# Patient Record
Sex: FEMALE | Race: WHITE | NOT HISPANIC OR LATINO | URBAN - METROPOLITAN AREA
[De-identification: names, ages, dates, MRNs, and addresses within clinical notes are randomized per-mention and may not be internally consistent; named-entity substitution may affect disease eponyms.]

---

## 2017-02-14 ENCOUNTER — OFFICE VISIT (OUTPATIENT)
Dept: FAMILY MEDICINE | Facility: CLINIC | Age: 27
End: 2017-02-14
Attending: FAMILY MEDICINE
Payer: COMMERCIAL

## 2017-02-14 VITALS
WEIGHT: 111.69 LBS | DIASTOLIC BLOOD PRESSURE: 68 MMHG | RESPIRATION RATE: 16 BRPM | SYSTOLIC BLOOD PRESSURE: 114 MMHG | HEART RATE: 74 BPM | HEIGHT: 62 IN | BODY MASS INDEX: 20.55 KG/M2 | OXYGEN SATURATION: 98 %

## 2017-02-14 DIAGNOSIS — J30.9 ALLERGIC RHINITIS, UNSPECIFIED ALLERGIC RHINITIS TRIGGER, UNSPECIFIED RHINITIS SEASONALITY: Primary | ICD-10-CM

## 2017-02-14 PROCEDURE — 99203 OFFICE O/P NEW LOW 30 MIN: CPT | Mod: S$GLB,,, | Performed by: FAMILY MEDICINE

## 2017-02-14 PROCEDURE — 99999 PR PBB SHADOW E&M-NEW PATIENT-LVL III: CPT | Mod: PBBFAC,,, | Performed by: FAMILY MEDICINE

## 2017-02-14 RX ORDER — LEVOCETIRIZINE DIHYDROCHLORIDE 5 MG/1
5 TABLET, FILM COATED ORAL NIGHTLY
Qty: 30 TABLET | Refills: 3 | Status: SHIPPED | OUTPATIENT
Start: 2017-02-14 | End: 2018-08-09

## 2017-02-14 NOTE — MR AVS SNAPSHOT
[facility]  [address] [place] [address]  [address]  [facility] [address]  Phone: [phone]                  Violette Adams   2017 10:40 AM   Office Visit    Description:  Female : 1990   Provider:  Monique Boyer MD   Department:  [facility]           Reason for Visit     Sinus Problem           Diagnoses this Visit        Comments    Allergic rhinitis, unspecified allergic rhinitis trigger, unspecified rhinitis seasonality    -  Primary            To Do List           Goals (5 Years of Data)     None       These Medications        Disp Refills Start End    levocetirizine (XYZAL) 5 MG tablet 30 tablet 3 2017    Take 1 tablet (5 mg total) by mouth every evening. - Oral    Pharmacy: [id number] [address] #: [phone]         [facility]     [facility] Care Line -  Assistance  Registered nurses in the [facility] Center provide clinical advisement, health education, appointment booking, and other advisory services.  Call for this free service at [phone].             Medications           Message regarding Medications     Verify the changes and/or additions to your medication regime listed below are the same as discussed with your clinician today.  If any of these changes or additions are incorrect, please notify your healthcare provider.        START taking these NEW medications        Refills    levocetirizine (XYZAL) 5 MG tablet 3    Sig: Take 1 tablet (5 mg total) by mouth every evening.    Class: Normal    Route: Oral           Verify that the below list of medications is an accurate representation of the medications you are currently taking.  If none reported, the list may be blank. If incorrect, please contact your healthcare provider. Carry this list with you in case of emergency.           Current Medications     levocetirizine (XYZAL) 5 MG tablet Take 1  "tablet (5 mg total) by mouth every evening.           Clinical Reference Information           Your Vitals Were     BP Pulse Resp Height Weight Last Period    114/68 (BP Location: Left arm, Patient Position: Sitting, BP Method: Manual) 74 16 5' 2" (1.575 m) 50.7 kg (111 lb 11.2 oz) 01/24/2017 (Exact Date)    SpO2 BMI             98% 20.43 kg/m2         Blood Pressure          Most Recent Value    BP  114/68      Allergies as of 2/14/2017     No Known Allergies      Immunizations Administered on Date of Encounter - 2/14/2017     None      Instructions    Your test results will be communicated to you via : [facility], Telephone or Letter.   If you have not received your test results in one week, please contact the clinic at [phone].       Language Assistance Services     ATTENTION: Language assistance services are available, free of charge. Please call [phone].      ATENCIÓN: Si habla español, tiene a sanches disposición servicios gratuitos de asistencia lingüística. Llame al [phone].     CHÚ Ý: N?u b?n nói Ti?ng Vi?t, có các d?ch v? h? tr? ngôn ng? mi?n phí dành cho b?n. G?i s? [phone].         [facility] complies with applicable Federal civil rights laws and does not discriminate on the basis of race, color, national origin, age, disability, or sex.        "

## 2017-02-14 NOTE — PATIENT INSTRUCTIONS
Your test results will be communicated to you via : [facility], Telephone or Letter.   If you have not received your test results in one week, please contact the clinic at [phone].

## 2017-02-19 NOTE — PROGRESS NOTES
"Subjective:       Patient ID: Violette Adams is a 26 y.o. female.    Chief Complaint: Sinus Problem    HPI   Pt is here for several weeks of nasal congestion associated with clear rhinorrhea and itchy eyes and nose   pt is taking otc temp improvement however some days are worse than others   Review of Systems   Constitutional: Negative for chills, fatigue and fever.   HENT: Positive for postnasal drip, rhinorrhea and sneezing. Negative for congestion, ear pain, sinus pressure and sore throat.    Respiratory: Negative for cough, chest tightness and shortness of breath.    Cardiovascular: Negative for chest pain and palpitations.       Objective:      Physical Exam   Constitutional: She appears well-developed and well-nourished. No distress.   HENT:   Head: Normocephalic and atraumatic.   Nares patent bilaterally with thin clear drainage erythematous nares    Cardiovascular: Normal rate and regular rhythm.  Exam reveals no gallop.    Pulmonary/Chest: Effort normal and breath sounds normal. No respiratory distress. She has no rales.       Assessment:       1. Allergic rhinitis, unspecified allergic rhinitis trigger, unspecified rhinitis seasonality        Plan:     start xyzal  flonase if needed  otc hydrocortisone to outer nares   F/u allergy if needed  rtc prn        "This note will not be shared with the patient."   "

## 2017-08-07 ENCOUNTER — LAB VISIT (OUTPATIENT)
Dept: LAB | Facility: HOSPITAL | Age: 27
End: 2017-08-07
Attending: FAMILY MEDICINE
Payer: COMMERCIAL

## 2017-08-07 ENCOUNTER — PATIENT MESSAGE (OUTPATIENT)
Dept: FAMILY MEDICINE | Facility: CLINIC | Age: 27
End: 2017-08-07

## 2017-08-07 ENCOUNTER — OFFICE VISIT (OUTPATIENT)
Dept: FAMILY MEDICINE | Facility: CLINIC | Age: 27
End: 2017-08-07
Attending: FAMILY MEDICINE
Payer: COMMERCIAL

## 2017-08-07 VITALS
BODY MASS INDEX: 20.46 KG/M2 | DIASTOLIC BLOOD PRESSURE: 72 MMHG | SYSTOLIC BLOOD PRESSURE: 120 MMHG | WEIGHT: 111.19 LBS | HEART RATE: 68 BPM | HEIGHT: 62 IN | RESPIRATION RATE: 16 BRPM

## 2017-08-07 DIAGNOSIS — Z01.419 ENCOUNTER FOR GYNECOLOGICAL EXAMINATION WITH PAPANICOLAOU SMEAR OF CERVIX: Primary | ICD-10-CM

## 2017-08-07 DIAGNOSIS — Z01.419 ENCOUNTER FOR GYNECOLOGICAL EXAMINATION WITH PAPANICOLAOU SMEAR OF CERVIX: ICD-10-CM

## 2017-08-07 LAB
ALBUMIN SERPL BCP-MCNC: 3.7 G/DL
ALP SERPL-CCNC: 45 U/L
ALT SERPL W/O P-5'-P-CCNC: 12 U/L
ANION GAP SERPL CALC-SCNC: 7 MMOL/L
AST SERPL-CCNC: 17 U/L
BASOPHILS # BLD AUTO: 0.02 K/UL
BASOPHILS NFR BLD: 0.3 %
BILIRUB SERPL-MCNC: 0.4 MG/DL
BILIRUB SERPL-MCNC: NEGATIVE MG/DL
BLOOD URINE, POC: NEGATIVE
BUN SERPL-MCNC: 17 MG/DL
C TRACH DNA SPEC QL NAA+PROBE: NOT DETECTED
CALCIUM SERPL-MCNC: 9.6 MG/DL
CANDIDA RRNA VAG QL PROBE: NEGATIVE
CHLORIDE SERPL-SCNC: 107 MMOL/L
CO2 SERPL-SCNC: 22 MMOL/L
COLOR, POC UA: YELLOW
CREAT SERPL-MCNC: 0.9 MG/DL
DIFFERENTIAL METHOD: NORMAL
EOSINOPHIL # BLD AUTO: 0.1 K/UL
EOSINOPHIL NFR BLD: 2 %
ERYTHROCYTE [DISTWIDTH] IN BLOOD BY AUTOMATED COUNT: 13.2 %
EST. GFR  (AFRICAN AMERICAN): >60 ML/MIN/1.73 M^2
EST. GFR  (NON AFRICAN AMERICAN): >60 ML/MIN/1.73 M^2
G VAGINALIS RRNA GENITAL QL PROBE: NEGATIVE
GLUCOSE SERPL-MCNC: 83 MG/DL
GLUCOSE UR QL STRIP: NORMAL
HCT VFR BLD AUTO: 39.5 %
HGB BLD-MCNC: 13.4 G/DL
KETONES UR QL STRIP: NEGATIVE
LEUKOCYTE ESTERASE URINE, POC: ABNORMAL
LYMPHOCYTES # BLD AUTO: 2.3 K/UL
LYMPHOCYTES NFR BLD: 38.9 %
MCH RBC QN AUTO: 28.8 PG
MCHC RBC AUTO-ENTMCNC: 33.9 G/DL
MCV RBC AUTO: 85 FL
MONOCYTES # BLD AUTO: 0.6 K/UL
MONOCYTES NFR BLD: 9.7 %
N GONORRHOEA DNA SPEC QL NAA+PROBE: NOT DETECTED
NEUTROPHILS # BLD AUTO: 2.9 K/UL
NEUTROPHILS NFR BLD: 49.1 %
NITRITE, POC UA: NEGATIVE
PH, POC UA: 5
PLATELET # BLD AUTO: 271 K/UL
PMV BLD AUTO: 10.5 FL
POTASSIUM SERPL-SCNC: 4.6 MMOL/L
PROT SERPL-MCNC: 6.9 G/DL
PROTEIN, POC: ABNORMAL
RBC # BLD AUTO: 4.66 M/UL
SODIUM SERPL-SCNC: 136 MMOL/L
SPECIFIC GRAVITY, POC UA: 1.02
T VAGINALIS RRNA GENITAL QL PROBE: NEGATIVE
TSH SERPL DL<=0.005 MIU/L-ACNC: 3.46 UIU/ML
UROBILINOGEN, POC UA: NORMAL
WBC # BLD AUTO: 5.97 K/UL

## 2017-08-07 PROCEDURE — 80053 COMPREHEN METABOLIC PANEL: CPT

## 2017-08-07 PROCEDURE — 81001 URINALYSIS AUTO W/SCOPE: CPT | Mod: S$GLB,,, | Performed by: FAMILY MEDICINE

## 2017-08-07 PROCEDURE — 36415 COLL VENOUS BLD VENIPUNCTURE: CPT | Mod: PO

## 2017-08-07 PROCEDURE — 87591 N.GONORRHOEAE DNA AMP PROB: CPT

## 2017-08-07 PROCEDURE — 88175 CYTOPATH C/V AUTO FLUID REDO: CPT

## 2017-08-07 PROCEDURE — 99999 PR PBB SHADOW E&M-EST. PATIENT-LVL III: CPT | Mod: PBBFAC,,, | Performed by: FAMILY MEDICINE

## 2017-08-07 PROCEDURE — 99395 PREV VISIT EST AGE 18-39: CPT | Mod: 25,S$GLB,, | Performed by: FAMILY MEDICINE

## 2017-08-07 PROCEDURE — 85025 COMPLETE CBC W/AUTO DIFF WBC: CPT

## 2017-08-07 PROCEDURE — 87480 CANDIDA DNA DIR PROBE: CPT

## 2017-08-07 PROCEDURE — 87660 TRICHOMONAS VAGIN DIR PROBE: CPT

## 2017-08-07 PROCEDURE — 84443 ASSAY THYROID STIM HORMONE: CPT

## 2017-08-07 RX ORDER — DESOGESTREL AND ETHINYL ESTRADIOL 21-5 (28)
1 KIT ORAL DAILY
Qty: 90 TABLET | Refills: 3 | Status: SHIPPED | OUTPATIENT
Start: 2017-08-07 | End: 2017-08-08 | Stop reason: SDUPTHER

## 2017-08-07 RX ORDER — DESOGESTREL AND ETHINYL ESTRADIOL 21-5 (28)
1 KIT ORAL DAILY
COMMUNITY
End: 2017-08-07 | Stop reason: SDUPTHER

## 2017-08-07 RX ORDER — DESOGESTREL AND ETHINYL ESTRADIOL 21-5 (28)
1 KIT ORAL DAILY
Qty: 90 TABLET | Refills: 3 | Status: CANCELLED | OUTPATIENT
Start: 2017-08-07

## 2017-08-07 NOTE — MEDICAL/APP STUDENT
Subjective:       Patient ID: Violette Adams is a 27 y.o. female.    Chief Complaint: Gynecologic Exam    HPI      Patient presents today for well woman exam.    LMP 07/11/2017. Last 4-5 days. Occurs regularly ~ 28 days. Light normal flow. Currently on Kariva OCP.    Pap smear: 12/2016 with previous PCP. Last was normal. History of abnormal pap smears, summer 2016. Never had colposcopy.     Believes she had HPV series. Medical records needs to be sent to [facility].     Diet: Improving, implementing more vegetables and fruits and protein  Exercise: Will be starting a program today     Review of Systems   Constitutional: Negative for activity change, appetite change, chills and fever.   HENT: Positive for congestion (2/2 Seasonal allergic rhinitis). Negative for sinus pressure and sore throat.    Eyes: Negative for visual disturbance.   Respiratory: Negative for chest tightness and shortness of breath.    Cardiovascular: Negative for chest pain and leg swelling.   Gastrointestinal: Negative for abdominal pain, blood in stool, constipation and diarrhea.   Endocrine: Negative for polydipsia and polyuria.   Genitourinary: Negative for difficulty urinating, dysuria, hematuria, vaginal bleeding and vaginal discharge.   Musculoskeletal: Negative for arthralgias and back pain.   Skin: Negative for rash and wound.   Allergic/Immunologic: Positive for environmental allergies.   Neurological: Negative for dizziness, light-headedness, numbness and headaches.   Hematological: Does not bruise/bleed easily.   Psychiatric/Behavioral: Negative for behavioral problems, decreased concentration and sleep disturbance.       Objective:      Physical Exam   Constitutional: She is oriented to person, place, and time. She appears well-developed and well-nourished.   HENT:   Head: Normocephalic.   Mouth/Throat: Oropharynx is clear and moist.   Eyes: Conjunctivae are normal. Pupils are equal, round, and reactive to light.   Neck: Normal range  of motion. Neck supple. No thyromegaly present.   Cardiovascular: Normal rate, regular rhythm, normal heart sounds and intact distal pulses.    Pulmonary/Chest: Effort normal and breath sounds normal. She has no wheezes. She exhibits no mass and no tenderness. Right breast exhibits no mass, no nipple discharge, no skin change and no tenderness. Left breast exhibits no mass, no nipple discharge, no skin change and no tenderness. Breasts are symmetrical.   Abdominal: Soft. Bowel sounds are normal. She exhibits no distension and no mass. There is no tenderness.   Genitourinary: There is no rash or tenderness on the right labia. There is no rash or tenderness on the left labia. Cervix exhibits discharge (White discharge) and friability.       Musculoskeletal: Normal range of motion.   Lymphadenopathy:     She has no cervical adenopathy.   Neurological: She is alert and oriented to person, place, and time.   Skin: Skin is warm and dry. Capillary refill takes less than 2 seconds.   Psychiatric: She has a normal mood and affect. Her behavior is normal. Judgment and thought content normal.       Assessment:     Violette Adams is a 28 yo female presenting for well woman exam.    Plan:     1. Well woman exam  - Pap smear screening  - Chlamydia trachomatis and Neisseria gonorrhoeae DNA cervix probe  - Urinalysis  - Vaginosis screen  - CBC  - CMP  - TSH    Irma Martinez  MS-4

## 2017-08-07 NOTE — PROGRESS NOTES
Subjective:       Patient ID: Violette Adams is a 27 y.o. female.    Chief Complaint: Gynecologic Exam    HPI   Pt is here for wwe she is well no avnl vaginal bleeding no discharge itching or burning   Review of Systems   Constitutional: Negative for activity change, chills, diaphoresis, fatigue and fever.   HENT: Negative for congestion, ear discharge, ear pain, hearing loss, postnasal drip, rhinorrhea, sinus pressure, sneezing, sore throat, trouble swallowing and voice change.    Eyes: Negative for photophobia, discharge, redness, itching and visual disturbance.   Respiratory: Negative for cough, chest tightness, shortness of breath and wheezing.    Cardiovascular: Negative for chest pain, palpitations and leg swelling.   Gastrointestinal: Negative for abdominal pain, anal bleeding, blood in stool, constipation, diarrhea, nausea, rectal pain and vomiting.   Genitourinary: Negative for dyspareunia, dysuria, flank pain, frequency, hematuria, menstrual problem, pelvic pain, urgency, vaginal bleeding and vaginal discharge.   Musculoskeletal: Negative for arthralgias, back pain, joint swelling and neck pain.   Skin: Negative for color change and rash.   Neurological: Negative for dizziness, speech difficulty, weakness, light-headedness, numbness and headaches.   Hematological: Does not bruise/bleed easily.   Psychiatric/Behavioral: Negative for agitation, confusion, decreased concentration, sleep disturbance and suicidal ideas. The patient is not nervous/anxious.        Objective:      Physical Exam   Constitutional: She appears well-developed and well-nourished.   HENT:   Head: Normocephalic and atraumatic.   Right Ear: External ear normal.   Left Ear: External ear normal.   Nose: Nose normal.   Mouth/Throat: Oropharynx is clear and moist.   Eyes: Conjunctivae and EOM are normal. Pupils are equal, round, and reactive to light. Right eye exhibits no discharge. Left eye exhibits no discharge.   Neck: Normal range of  "motion. Neck supple. No thyromegaly present.   Cardiovascular: Normal rate, regular rhythm, normal heart sounds and intact distal pulses.  Exam reveals no gallop and no friction rub.    No murmur heard.  Pulmonary/Chest: Effort normal and breath sounds normal. She has no wheezes. She has no rales.   Abdominal: Soft. Bowel sounds are normal. She exhibits no distension. There is no tenderness. There is no rebound and no guarding.   Genitourinary: Vagina normal and uterus normal. No vaginal discharge found.   Genitourinary Comments: nefg v/v urethra/urethral meatus w/o lesions or prolapse normal hair distribution cervix pink no adnexal tenderness or fullness moveable uterus     Breasts symmetric no masses nipple discharge or overlying skin changes    Musculoskeletal: Normal range of motion. She exhibits no edema or tenderness.   Lymphadenopathy:     She has no cervical adenopathy.   Neurological: She is alert. No cranial nerve deficit. She exhibits normal muscle tone. Coordination normal.   Skin: Skin is warm and dry. No rash noted. No erythema.   Psychiatric: She has a normal mood and affect. Her behavior is normal. Judgment and thought content normal.       Assessment:       1. Encounter for gynecological examination with Papanicolaou smear of cervix        Plan:     orders cmp cbc lipid tsh urine pap gc/chl  Cont meds   low falt diet  Graded exercise    Health maintenance  Pap today  Breast exam today  Lipid ordered  Flu shot in fall  Tetanus q 10 years      "This note will not be shared with the patient."   "

## 2017-08-07 NOTE — PATIENT INSTRUCTIONS
Your test results will be communicated to you via : [facility], Telephone or Letter.   If you have not received your test results in one week, please contact the clinic at [phone].

## 2017-08-07 NOTE — TELEPHONE ENCOUNTER
----- Message from Estelle Del Rio sent at 8/7/2017  3:40 PM CDT -----  Contact: pt  _  1st Request  _  2nd Request  _  3rd Request    Please refill the medication(s) listed below. Please call the patient when the prescription(s) is ready for  at the phone number (___)(___-_____) .[phone]    desog-e.estradiol/e.estradiol (KARIVA) 0.15-0.02 mgx21 /0.01 mg x 5 per tablet   Medication #1    Medication #2      [facility] [address]

## 2017-08-08 RX ORDER — DESOGESTREL AND ETHINYL ESTRADIOL 21-5 (28)
1 KIT ORAL DAILY
Qty: 90 TABLET | Refills: 3 | Status: SHIPPED | OUTPATIENT
Start: 2017-08-08 | End: 2017-08-09 | Stop reason: SDUPTHER

## 2017-08-09 RX ORDER — DESOGESTREL AND ETHINYL ESTRADIOL 21-5 (28)
1 KIT ORAL DAILY
Qty: 90 TABLET | Refills: 3 | Status: SHIPPED | OUTPATIENT
Start: 2017-08-09 | End: 2018-07-04 | Stop reason: SDUPTHER

## 2018-05-24 ENCOUNTER — TELEPHONE (OUTPATIENT)
Dept: FAMILY MEDICINE | Facility: CLINIC | Age: 28
End: 2018-05-24

## 2018-05-24 DIAGNOSIS — Z00.00 WELLNESS EXAMINATION: Primary | ICD-10-CM

## 2018-05-24 NOTE — TELEPHONE ENCOUNTER
Patient would like to have pre visit labs done prior to her appointment on 6/5/18. Please enter orders.Thanks.

## 2018-05-24 NOTE — TELEPHONE ENCOUNTER
----- Message from Drea Sunshine LPN sent at 5/24/2018  7:53 AM CDT -----  Patient has appointment scheduled on 6/5/18 is asking about blood work and fasting. Please contact patient. Thanks in advance.

## 2018-07-03 NOTE — TELEPHONE ENCOUNTER
----- Message from Sultana Sharma sent at 7/3/2018  7:56 AM CDT -----  Contact: MATI DE LA CRUZ [[id number]]      Can the clinic reply in [facility]: No      Please refill the medication(s) listed below. Please call the patient when the prescription(s) is ready for  at this phone number   MATI DE LA CRUZ / # [phone]        Medication #1 desog-e.estradiol/e.estradiol (KARIVA) 0.15-0.02 mgx21 /0.01 mg x 5 per tablet      Preferred Pharmacy: [facility] #[id number] - [place] - [address]     [phone] (Phone)  [phone] (Fax)

## 2018-07-04 RX ORDER — DESOGESTREL/ETHINYL ESTRADIOL AND ETHINYL ESTRADIOL 21-5 (28)
1 KIT ORAL DAILY
Qty: 84 TABLET | Refills: 3 | Status: SHIPPED | OUTPATIENT
Start: 2018-07-04 | End: 2018-08-09 | Stop reason: SDUPTHER

## 2018-07-06 RX ORDER — DESOGESTREL AND ETHINYL ESTRADIOL 21-5 (28)
1 KIT ORAL DAILY
Qty: 90 TABLET | Refills: 3 | Status: SHIPPED | OUTPATIENT
Start: 2018-07-06 | End: 2018-10-03 | Stop reason: RX

## 2018-08-06 ENCOUNTER — LAB VISIT (OUTPATIENT)
Dept: LAB | Facility: HOSPITAL | Age: 28
End: 2018-08-06
Attending: FAMILY MEDICINE
Payer: COMMERCIAL

## 2018-08-06 DIAGNOSIS — Z00.00 WELLNESS EXAMINATION: ICD-10-CM

## 2018-08-06 LAB
BASOPHILS # BLD AUTO: 0.05 K/UL
BASOPHILS NFR BLD: 0.7 %
CHOLEST SERPL-MCNC: 195 MG/DL
CHOLEST/HDLC SERPL: 2.4 {RATIO}
DIFFERENTIAL METHOD: NORMAL
EOSINOPHIL # BLD AUTO: 0.2 K/UL
EOSINOPHIL NFR BLD: 2.4 %
ERYTHROCYTE [DISTWIDTH] IN BLOOD BY AUTOMATED COUNT: 12.8 %
HCT VFR BLD AUTO: 42.3 %
HDLC SERPL-MCNC: 82 MG/DL
HDLC SERPL: 42.1 %
HGB BLD-MCNC: 13.7 G/DL
IMM GRANULOCYTES # BLD AUTO: 0 K/UL
IMM GRANULOCYTES NFR BLD AUTO: 0 %
LDLC SERPL CALC-MCNC: 97.2 MG/DL
LYMPHOCYTES # BLD AUTO: 3.1 K/UL
LYMPHOCYTES NFR BLD: 44.2 %
MCH RBC QN AUTO: 28.5 PG
MCHC RBC AUTO-ENTMCNC: 32.4 G/DL
MCV RBC AUTO: 88 FL
MONOCYTES # BLD AUTO: 0.7 K/UL
MONOCYTES NFR BLD: 9.9 %
NEUTROPHILS # BLD AUTO: 3 K/UL
NEUTROPHILS NFR BLD: 42.8 %
NONHDLC SERPL-MCNC: 113 MG/DL
NRBC BLD-RTO: 0 /100 WBC
PLATELET # BLD AUTO: 308 K/UL
PMV BLD AUTO: 11 FL
RBC # BLD AUTO: 4.8 M/UL
TRIGL SERPL-MCNC: 79 MG/DL
WBC # BLD AUTO: 6.95 K/UL

## 2018-08-06 PROCEDURE — 80061 LIPID PANEL: CPT

## 2018-08-06 PROCEDURE — 85025 COMPLETE CBC W/AUTO DIFF WBC: CPT

## 2018-08-06 PROCEDURE — 36415 COLL VENOUS BLD VENIPUNCTURE: CPT | Mod: PO

## 2018-08-09 ENCOUNTER — OFFICE VISIT (OUTPATIENT)
Dept: FAMILY MEDICINE | Facility: CLINIC | Age: 28
End: 2018-08-09
Attending: FAMILY MEDICINE
Payer: COMMERCIAL

## 2018-08-09 VITALS
WEIGHT: 111.69 LBS | HEIGHT: 62 IN | DIASTOLIC BLOOD PRESSURE: 80 MMHG | BODY MASS INDEX: 20.55 KG/M2 | HEART RATE: 70 BPM | SYSTOLIC BLOOD PRESSURE: 112 MMHG | OXYGEN SATURATION: 97 %

## 2018-08-09 DIAGNOSIS — Z01.419 ENCOUNTER FOR GYNECOLOGICAL EXAMINATION WITH PAPANICOLAOU SMEAR OF CERVIX: Primary | ICD-10-CM

## 2018-08-09 PROCEDURE — 87491 CHLMYD TRACH DNA AMP PROBE: CPT

## 2018-08-09 PROCEDURE — 99395 PREV VISIT EST AGE 18-39: CPT | Mod: S$GLB,,, | Performed by: FAMILY MEDICINE

## 2018-08-09 PROCEDURE — 88175 CYTOPATH C/V AUTO FLUID REDO: CPT

## 2018-08-09 PROCEDURE — 99999 PR PBB SHADOW E&M-EST. PATIENT-LVL IV: CPT | Mod: PBBFAC,,, | Performed by: FAMILY MEDICINE

## 2018-08-09 NOTE — PROGRESS NOTES
Subjective:       Patient ID: Violette Adams is a 28 y.o. female.    Chief Complaint: Gynecologic Exam    HPI   Pt is here for wwe she is well no abnl vaginal bleeding no discharge itching or burning no dysuria or hematuria no brbpr   Pt is under stress right now job demanding along with planning a wedding pt feels anxious declines meds since this is situational no si/hi no  Panic attacks   Review of Systems   Constitutional: Negative for activity change, chills, diaphoresis, fatigue, fever and unexpected weight change.   HENT: Negative for congestion, ear discharge, ear pain, hearing loss, postnasal drip, rhinorrhea, sinus pressure, sneezing, sore throat, trouble swallowing and voice change.    Eyes: Negative for photophobia, discharge, redness, itching and visual disturbance.   Respiratory: Negative for cough, chest tightness, shortness of breath and wheezing.    Cardiovascular: Positive for palpitations. Negative for chest pain and leg swelling.   Gastrointestinal: Negative for abdominal pain, anal bleeding, blood in stool, constipation, diarrhea, nausea, rectal pain and vomiting.   Endocrine: Negative for polydipsia and polyuria.   Genitourinary: Negative for difficulty urinating, dyspareunia, dysuria, flank pain, frequency, hematuria, menstrual problem, pelvic pain, urgency, vaginal bleeding and vaginal discharge.   Musculoskeletal: Negative for arthralgias, back pain, joint swelling and neck pain.   Skin: Negative for color change and rash.   Neurological: Negative for dizziness, speech difficulty, weakness, light-headedness, numbness and headaches.   Hematological: Does not bruise/bleed easily.   Psychiatric/Behavioral: Positive for dysphoric mood. Negative for agitation, confusion, decreased concentration, sleep disturbance and suicidal ideas. The patient is nervous/anxious.        Objective:      Physical Exam   Constitutional: She appears well-developed and well-nourished.   HENT:   Head: Normocephalic  "and atraumatic.   Right Ear: External ear normal.   Left Ear: External ear normal.   Nose: Nose normal.   Mouth/Throat: Oropharynx is clear and moist.   Eyes: Conjunctivae and EOM are normal. Pupils are equal, round, and reactive to light. Right eye exhibits no discharge. Left eye exhibits no discharge.   Neck: Normal range of motion. Neck supple. No thyromegaly present.   Cardiovascular: Normal rate, regular rhythm and intact distal pulses.  Exam reveals no gallop and no friction rub.    Pulmonary/Chest: Effort normal and breath sounds normal. She has no wheezes. She has no rales.   Abdominal: Soft. Bowel sounds are normal. She exhibits no distension. There is no tenderness. There is no rebound and no guarding.   Genitourinary: Vagina normal and uterus normal. No vaginal discharge found.   Genitourinary Comments: nefg v/v urethra/urethral meatus w/o leisons or prolapse normal hair distribution cervix pink no adnexal tenderness or fullness moveable uterus     Breasts symmetric no masses nipple discharge or overlying skin changes    Musculoskeletal: Normal range of motion. She exhibits no edema or tenderness.   Lymphadenopathy:     She has no cervical adenopathy.   Neurological: She is alert. No cranial nerve deficit. She exhibits normal muscle tone. Coordination normal.   Skin: Skin is warm and dry. No rash noted. No erythema.   Psychiatric: She has a normal mood and affect. Her behavior is normal. Judgment and thought content normal.       Assessment:       1. Encounter for gynecological examination with Papanicolaou smear of cervix        Plan:     orders cbc cmp lipid tsh urine  Cont meds     Low fat diet  Graded exercise  "This note will not be shared with the patient."   "

## 2018-08-09 NOTE — PATIENT INSTRUCTIONS
Your test results will be communicated to you via : [facility], Telephone or Letter.   If you have not received your test results in one week, please contact the clinic at [phone].

## 2018-08-10 LAB
C TRACH DNA SPEC QL NAA+PROBE: NOT DETECTED
N GONORRHOEA DNA SPEC QL NAA+PROBE: NOT DETECTED

## 2018-10-03 ENCOUNTER — PATIENT MESSAGE (OUTPATIENT)
Dept: FAMILY MEDICINE | Facility: CLINIC | Age: 28
End: 2018-10-03

## 2018-10-03 ENCOUNTER — TELEPHONE (OUTPATIENT)
Dept: FAMILY MEDICINE | Facility: CLINIC | Age: 28
End: 2018-10-03

## 2018-10-03 RX ORDER — NORGESTIMATE AND ETHINYL ESTRADIOL 7DAYSX3 28
1 KIT ORAL DAILY
Qty: 90 TABLET | Refills: 3 | Status: SHIPPED | OUTPATIENT
Start: 2018-10-03 | End: 2019-08-02

## 2018-10-03 NOTE — TELEPHONE ENCOUNTER
----- Message from Priscilla Frederick sent at 10/3/2018 11:42 AM CDT -----  Contact: Estelle from [facility]     Name of Who is Calling: Estelle from [facility]     What is the request in detail: Estelle would like an alternative birth control sent to pharmacy states medication Viorele is out of stock. Please call       Can the clinic reply by [facility]: no    What Number to Call Back if not in [facility]: [phone]

## 2019-08-01 ENCOUNTER — TELEPHONE (OUTPATIENT)
Dept: FAMILY MEDICINE | Facility: CLINIC | Age: 29
End: 2019-08-01

## 2019-08-01 DIAGNOSIS — Z00.00 ANNUAL PHYSICAL EXAM: Primary | ICD-10-CM

## 2019-08-01 NOTE — TELEPHONE ENCOUNTER
----- Message from Radha Lei sent at 8/1/2019 12:09 PM CDT -----  Contact: MATI DE LA CRUZ   Name of Who is Calling: MATI DE LA CRUZ       What is the request in detail: patient is requesting a call back. She states that she has an annual appointment scheduled for 8/3 which was approved by her insurance company. She wants to know if she would need to have any blood work done for that visit.       Can the clinic reply by [facility]: no      What Number to Call Back if not in [facility]:  [phone]

## 2019-08-02 ENCOUNTER — OFFICE VISIT (OUTPATIENT)
Dept: FAMILY MEDICINE | Facility: CLINIC | Age: 29
End: 2019-08-02
Attending: FAMILY MEDICINE
Payer: COMMERCIAL

## 2019-08-02 ENCOUNTER — TELEPHONE (OUTPATIENT)
Dept: FAMILY MEDICINE | Facility: CLINIC | Age: 29
End: 2019-08-02

## 2019-08-02 VITALS
WEIGHT: 113.19 LBS | HEART RATE: 87 BPM | BODY MASS INDEX: 20.83 KG/M2 | HEIGHT: 62 IN | OXYGEN SATURATION: 97 % | DIASTOLIC BLOOD PRESSURE: 84 MMHG | SYSTOLIC BLOOD PRESSURE: 128 MMHG

## 2019-08-02 DIAGNOSIS — Z01.419 ENCOUNTER FOR GYNECOLOGICAL EXAMINATION WITH PAPANICOLAOU SMEAR OF CERVIX: Primary | ICD-10-CM

## 2019-08-02 PROCEDURE — 81001 POCT URINALYSIS, DIPSTICK OR TABLET REAGENT, AUTOMATED, WITH MICROSCOP: ICD-10-PCS | Mod: S$GLB,,, | Performed by: FAMILY MEDICINE

## 2019-08-02 PROCEDURE — 88175 CYTOPATH C/V AUTO FLUID REDO: CPT

## 2019-08-02 PROCEDURE — 87491 CHLMYD TRACH DNA AMP PROBE: CPT

## 2019-08-02 PROCEDURE — 81001 URINALYSIS AUTO W/SCOPE: CPT | Mod: S$GLB,,, | Performed by: FAMILY MEDICINE

## 2019-08-02 PROCEDURE — 99999 PR PBB SHADOW E&M-EST. PATIENT-LVL III: CPT | Mod: PBBFAC,,, | Performed by: FAMILY MEDICINE

## 2019-08-02 PROCEDURE — 99395 PR PREVENTIVE VISIT,EST,18-39: ICD-10-PCS | Mod: 25,S$GLB,, | Performed by: FAMILY MEDICINE

## 2019-08-02 PROCEDURE — 99395 PREV VISIT EST AGE 18-39: CPT | Mod: 25,S$GLB,, | Performed by: FAMILY MEDICINE

## 2019-08-02 PROCEDURE — 99999 PR PBB SHADOW E&M-EST. PATIENT-LVL III: ICD-10-PCS | Mod: PBBFAC,,, | Performed by: FAMILY MEDICINE

## 2019-08-02 RX ORDER — DESOGESTREL/ETHINYL ESTRADIOL AND ETHINYL ESTRADIOL 21-5 (28)
1 KIT ORAL DAILY
Refills: 0 | COMMUNITY
Start: 2019-07-05 | End: 2019-09-30 | Stop reason: SDUPTHER

## 2019-08-02 NOTE — TELEPHONE ENCOUNTER
----- Message from Radha Odom sent at 8/2/2019 10:06 AM CDT -----  Contact: MATI DE LA CRUZ   Type:  Patient Returning Call      Who Called: MATI DE LA CRUZ       Who Left Message for Patient: unknown      Does the patient know what this is regarding?: unknown      Best Call Back Number: [phone]      Additional Information: n/a

## 2019-08-02 NOTE — PATIENT INSTRUCTIONS
Violette,     We are always striving for excellence. Should you receive a patient experience survey electronically or by mail, we would appreciate if you would take a few moments to give us your feedback. These surveys let us know our strengths as well as areas of opportunity for improvement to better serve you.    Thank you for your time,  Teri Santoyo MA    Your test results will be communicated to you via : [facility], Telephone or Letter.   If you have not received your test results in one week, please contact the clinic at [phone].

## 2019-08-02 NOTE — TELEPHONE ENCOUNTER
----- Message from Kari Barakat sent at 8/2/2019 11:09 AM CDT -----  Please call pt work number [phone]

## 2019-08-05 LAB
BILIRUB SERPL-MCNC: NORMAL MG/DL
BLOOD URINE, POC: NORMAL
C TRACH DNA SPEC QL NAA+PROBE: NOT DETECTED
COLOR, POC UA: YELLOW
GLUCOSE UR QL STRIP: NORMAL
KETONES UR QL STRIP: NORMAL
LEUKOCYTE ESTERASE URINE, POC: NORMAL
N GONORRHOEA DNA SPEC QL NAA+PROBE: NOT DETECTED
NITRITE, POC UA: NORMAL
PH, POC UA: 6
PROTEIN, POC: NORMAL
SPECIFIC GRAVITY, POC UA: 1
UROBILINOGEN, POC UA: NORMAL

## 2019-08-06 NOTE — PROGRESS NOTES
Subjective:       Patient ID: Violette Esposito is a 29 y.o. female.    Chief Complaint: Gynecologic Exam    HPI   Pt is here for wwe she is well no abnl vaginal bleeding no discharge itching or burning no dysuria or hematuria no brbpr   Review of Systems   Constitutional: Negative for activity change, chills, diaphoresis, fatigue, fever and unexpected weight change.   HENT: Negative for congestion, ear discharge, ear pain, hearing loss, postnasal drip, rhinorrhea, sinus pressure, sneezing, sore throat, trouble swallowing and voice change.    Eyes: Negative for photophobia, discharge, redness, itching and visual disturbance.   Respiratory: Negative for cough, chest tightness, shortness of breath and wheezing.    Cardiovascular: Negative for chest pain, palpitations and leg swelling.   Gastrointestinal: Negative for abdominal pain, anal bleeding, blood in stool, constipation, diarrhea, nausea, rectal pain and vomiting.   Endocrine: Negative for polydipsia and polyuria.   Genitourinary: Negative for difficulty urinating, dyspareunia, dysuria, flank pain, frequency, hematuria, menstrual problem, pelvic pain, urgency, vaginal bleeding and vaginal discharge.   Musculoskeletal: Negative for arthralgias, back pain, joint swelling and neck pain.   Skin: Negative for color change and rash.   Neurological: Negative for dizziness, speech difficulty, weakness, light-headedness, numbness and headaches.   Hematological: Does not bruise/bleed easily.   Psychiatric/Behavioral: Negative for agitation, confusion, decreased concentration, dysphoric mood, sleep disturbance and suicidal ideas. The patient is not nervous/anxious.        Objective:      Physical Exam   Constitutional: She appears well-developed and well-nourished.   HENT:   Head: Normocephalic and atraumatic.   Right Ear: External ear normal.   Left Ear: External ear normal.   Nose: Nose normal.   Mouth/Throat: Oropharynx is clear and moist.   Eyes: Pupils are equal,  "round, and reactive to light. Conjunctivae and EOM are normal. Right eye exhibits no discharge. Left eye exhibits no discharge.   Neck: Normal range of motion. Neck supple. No thyromegaly present.   Cardiovascular: Normal rate and regular rhythm. Exam reveals no gallop.   Pulmonary/Chest: Effort normal and breath sounds normal. She has no wheezes. She has no rales.   Abdominal: Soft. Bowel sounds are normal. She exhibits no distension. There is no tenderness. There is no rebound and no guarding.   Genitourinary: Vagina normal and uterus normal. No vaginal discharge found.   Genitourinary Comments: nefg v/v urethra/urethral meatus w/o lesions or prolapse normal hair distribution cervix pink no adnexal tenderness or fullness moveable uterus    Breasts symmetric no masses nipple discharge or overlying skin changes    Musculoskeletal: Normal range of motion. She exhibits no edema or tenderness.   Lymphadenopathy:     She has no cervical adenopathy.   Neurological: She is alert. No cranial nerve deficit. She exhibits normal muscle tone. Coordination normal.   Skin: Skin is warm and dry. No rash noted. No erythema.   Psychiatric: She has a normal mood and affect. Her behavior is normal. Judgment and thought content normal.       Assessment:       1. Encounter for gynecological examination with Papanicolaou smear of cervix        Plan:        orders cmp lipid cbc tsh urine pap gc/chl  Cont meds  Low fat diet  Graded exercise   incrase water intake  rtc annually and prn    Health maintenancepap today   Breast exam today  Lipid ordered  Flu in fall  Tetanus q 10 years  "This note will not be shared with the patient."   "

## 2019-09-27 ENCOUNTER — PATIENT MESSAGE (OUTPATIENT)
Dept: FAMILY MEDICINE | Facility: CLINIC | Age: 29
End: 2019-09-27

## 2019-09-30 NOTE — TELEPHONE ENCOUNTER
Michael Amaya,    Can I get a refill on Kariva (BC pills) sent to [facility]?    Phone: [phone]    [email or web address]    Thank you,    Violette

## 2019-10-02 ENCOUNTER — TELEPHONE (OUTPATIENT)
Dept: FAMILY MEDICINE | Facility: CLINIC | Age: 29
End: 2019-10-02

## 2019-10-03 ENCOUNTER — TELEPHONE (OUTPATIENT)
Dept: FAMILY MEDICINE | Facility: CLINIC | Age: 29
End: 2019-10-03

## 2019-10-03 ENCOUNTER — PATIENT MESSAGE (OUTPATIENT)
Dept: FAMILY MEDICINE | Facility: CLINIC | Age: 29
End: 2019-10-03

## 2019-10-03 RX ORDER — DESOGESTREL AND ETHINYL ESTRADIOL 21-5 (28)
1 KIT ORAL DAILY
Qty: 30 TABLET | Refills: 11 | Status: SHIPPED | OUTPATIENT
Start: 2019-10-03 | End: 2019-10-03 | Stop reason: SDUPTHER

## 2019-10-03 RX ORDER — DESOGESTREL AND ETHINYL ESTRADIOL 21-5 (28)
1 KIT ORAL DAILY
Qty: 30 TABLET | Refills: 11 | Status: SHIPPED | OUTPATIENT
Start: 2019-10-03 | End: 2020-08-27 | Stop reason: SDUPTHER

## 2019-10-03 NOTE — TELEPHONE ENCOUNTER
----- Message from Rose Villalba sent at 10/3/2019  8:46 AM CDT -----  Contact: MATI MORA [[id number]]  Can the clinic reply in [facility]: Y      Please refill the medication(s) listed below. Please call the patient when the prescription(s) is ready for  at this phone number  [phone].       Medication #1 KARIVA, 28, 0.15-0.02 mgx21 /0.01 mg x 5 per tablet      Preferred Pharmacy: [facility]  [address] [place]

## 2019-10-05 RX ORDER — DESOGESTREL/ETHINYL ESTRADIOL AND ETHINYL ESTRADIOL 21-5 (28)
1 KIT ORAL DAILY
Qty: 84 TABLET | Refills: 3 | Status: SHIPPED | OUTPATIENT
Start: 2019-10-05 | End: 2020-03-13 | Stop reason: SDUPTHER

## 2020-03-13 ENCOUNTER — OFFICE VISIT (OUTPATIENT)
Dept: FAMILY MEDICINE | Facility: CLINIC | Age: 30
End: 2020-03-13
Payer: COMMERCIAL

## 2020-03-13 VITALS
HEART RATE: 112 BPM | WEIGHT: 112.88 LBS | TEMPERATURE: 99 F | BODY MASS INDEX: 20.77 KG/M2 | DIASTOLIC BLOOD PRESSURE: 80 MMHG | SYSTOLIC BLOOD PRESSURE: 129 MMHG | HEIGHT: 62 IN

## 2020-03-13 DIAGNOSIS — J10.1 INFLUENZA B: Primary | ICD-10-CM

## 2020-03-13 DIAGNOSIS — R05.9 COUGH: ICD-10-CM

## 2020-03-13 DIAGNOSIS — R09.81 NASAL CONGESTION: ICD-10-CM

## 2020-03-13 LAB
INFLUENZA A, MOLECULAR: NEGATIVE
INFLUENZA B, MOLECULAR: POSITIVE
SPECIMEN SOURCE: ABNORMAL

## 2020-03-13 PROCEDURE — 3008F PR BODY MASS INDEX (BMI) DOCUMENTED: ICD-10-PCS | Mod: CPTII,S$GLB,, | Performed by: NURSE PRACTITIONER

## 2020-03-13 PROCEDURE — 99999 PR PBB SHADOW E&M-EST. PATIENT-LVL III: ICD-10-PCS | Mod: PBBFAC,,, | Performed by: NURSE PRACTITIONER

## 2020-03-13 PROCEDURE — 99213 PR OFFICE/OUTPT VISIT, EST, LEVL III, 20-29 MIN: ICD-10-PCS | Mod: S$GLB,,, | Performed by: NURSE PRACTITIONER

## 2020-03-13 PROCEDURE — 99999 PR PBB SHADOW E&M-EST. PATIENT-LVL III: CPT | Mod: PBBFAC,,, | Performed by: NURSE PRACTITIONER

## 2020-03-13 PROCEDURE — 87502 INFLUENZA DNA AMP PROBE: CPT | Mod: PO

## 2020-03-13 PROCEDURE — 99213 OFFICE O/P EST LOW 20 MIN: CPT | Mod: S$GLB,,, | Performed by: NURSE PRACTITIONER

## 2020-03-13 PROCEDURE — 3008F BODY MASS INDEX DOCD: CPT | Mod: CPTII,S$GLB,, | Performed by: NURSE PRACTITIONER

## 2020-03-13 RX ORDER — PROMETHAZINE HYDROCHLORIDE AND DEXTROMETHORPHAN HYDROBROMIDE 6.25; 15 MG/5ML; MG/5ML
5 SYRUP ORAL 2 TIMES DAILY PRN
Qty: 118 ML | Refills: 0 | Status: SHIPPED | OUTPATIENT
Start: 2020-03-13 | End: 2020-03-23

## 2020-03-13 RX ORDER — OSELTAMIVIR PHOSPHATE 75 MG/1
75 CAPSULE ORAL 2 TIMES DAILY
Qty: 10 CAPSULE | Refills: 0 | Status: SHIPPED | OUTPATIENT
Start: 2020-03-13 | End: 2020-03-18

## 2020-03-13 NOTE — PROGRESS NOTES
Subjective:       Patient ID: Violette Esposito is a 29 y.o. female.    Chief Complaint: Cough; Nasal Congestion; and Fever    Cough   This is a new problem. The current episode started yesterday. The problem has been unchanged. The problem occurs every few minutes. The cough is productive of sputum. Associated symptoms include a fever, nasal congestion, postnasal drip and rhinorrhea. Pertinent negatives include no chest pain, chills, ear congestion, ear pain, headaches, heartburn, hemoptysis, myalgias, rash, sore throat, shortness of breath, sweats, weight loss or wheezing. Nothing aggravates the symptoms. She has tried nothing for the symptoms. The treatment provided no relief. There is no history of asthma, bronchiectasis, bronchitis, COPD, emphysema, environmental allergies or pneumonia.   History reviewed. No pertinent past medical history.  Social History     Socioeconomic History    Marital status:      Spouse name: Not on file    Number of children: Not on file    Years of education: Not on file    Highest education level: Not on file   Occupational History    Not on file   Social Needs    Financial resource strain: Not on file    Food insecurity:     Worry: Not on file     Inability: Not on file    Transportation needs:     Medical: Not on file     Non-medical: Not on file   Tobacco Use    Smoking status: Never Smoker    Smokeless tobacco: Never Used   Substance and Sexual Activity    Alcohol use: Yes    Drug use: No    Sexual activity: Yes     Birth control/protection: Pill   Lifestyle    Physical activity:     Days per week: Not on file     Minutes per session: Not on file    Stress: Not at all   Relationships    Social connections:     Talks on phone: Not on file     Gets together: Not on file     Attends Taoism service: Not on file     Active member of club or organization: Not on file     Attends meetings of clubs or organizations: Not on file     Relationship status: Not on  file   Other Topics Concern    Not on file   Social History Narrative    Not on file     Past Surgical History:   Procedure Laterality Date    WISDOM TOOTH EXTRACTION         Review of Systems   Constitutional: Positive for fever. Negative for chills and weight loss.   HENT: Positive for postnasal drip, rhinorrhea and sinus pressure. Negative for ear pain and sore throat.    Eyes: Negative.    Respiratory: Positive for cough. Negative for hemoptysis, shortness of breath and wheezing.    Cardiovascular: Negative.  Negative for chest pain.   Gastrointestinal: Negative.  Negative for heartburn.   Endocrine: Negative.    Genitourinary: Negative.    Musculoskeletal: Negative.  Negative for myalgias.   Skin: Negative.  Negative for rash.   Allergic/Immunologic: Negative.  Negative for environmental allergies.   Neurological: Negative.  Negative for headaches.   Psychiatric/Behavioral: Negative.        Objective:      Physical Exam   Constitutional: She is oriented to person, place, and time. She appears well-developed and well-nourished.   HENT:   Head: Normocephalic.   Right Ear: Hearing, tympanic membrane, external ear and ear canal normal.   Left Ear: Hearing, tympanic membrane, external ear and ear canal normal.   Nose: Rhinorrhea present. Right sinus exhibits no maxillary sinus tenderness and no frontal sinus tenderness. Left sinus exhibits no maxillary sinus tenderness and no frontal sinus tenderness.   Mouth/Throat: Uvula is midline, oropharynx is clear and moist and mucous membranes are normal.   Eyes: Pupils are equal, round, and reactive to light. Conjunctivae are normal.   Neck: Normal range of motion. Neck supple.   Cardiovascular: Regular rhythm and normal heart sounds.   Pulmonary/Chest: Effort normal and breath sounds normal.   Abdominal: Soft. Bowel sounds are normal.   Musculoskeletal: Normal range of motion.   Neurological: She is alert and oriented to person, place, and time.   Skin: Skin is warm and  dry. Capillary refill takes 2 to 3 seconds.   Psychiatric: She has a normal mood and affect. Her behavior is normal. Judgment and thought content normal.   Nursing note and vitals reviewed.      Assessment:   1.      Influenza B  2. Cough    3. Nasal congestion        Plan:        Violette was seen today for cough, nasal congestion and fever.    Diagnoses and all orders for this visit:    Influenza B  Cough  Nasal congestion  -     Influenza A & B by Molecular  -     oseltamivir (TAMIFLU) 75 MG capsule; Take 1 capsule (75 mg total) by mouth 2 (two) times daily. for 5 days  -     promethazine-dextromethorphan (PROMETHAZINE-DM) 6.25-15 mg/5 mL Syrp; Take 5 mLs by mouth 2 (two) times daily as needed.  Handout r/t tamiflu uses, potential side effects/interactions given  Handout r/t influenza signs/symptoms, treatment given        Hydrate well        Rest        Motrin OTC as directed        Report to ER immediately if symptoms worsen

## 2020-03-13 NOTE — PATIENT INSTRUCTIONS
"Hydrate well  Rest  Motrin OTC as directed  Cough medication causes drowsiness  Report to ER immediately if symptoms worsen    Oseltamivir: Patient drug information  [employer] here.  Copyright 0696-2003 [employer] All rights reserved.  (For additional information see "Oseltamivir: Drug information" and see "Oseltamivir: Pediatric drug information")  Brand Names: US  · Tamiflu    Brand Names: Robert  · GONZALEZ-Oseltamivir;  · Tamiflu    What is this drug used for?    It is used to treat or prevent the flu.    What do I need to tell my doctor BEFORE I take this drug?    If you have an allergy to oseltamivir or any other part of this drug.    If you are allergic to this drug; any part of this drug; or any other drugs, foods, or substances. Tell your doctor about the allergy and what signs you had.    If you have kidney disease.    This is not a list of all drugs or health problems that interact with this drug.    Tell your doctor and pharmacist about all of your drugs (prescription or OTC, natural products, vitamins) and health problems. You must check to make sure that it is safe for you to take this drug with all of your drugs and health problems. Do not start, stop, or change the dose of any drug without checking with your doctor.    What are some things I need to know or do while I take this drug?    All products:    Tell all of your health care providers that you take this drug. This includes your doctors, nurses, pharmacists, and dentists.    This drug is not to be taken in place of a flu shot. If your doctor told you to get the flu shot, you need to get it.    This drug does not treat the common cold.    This drug does not stop the spread of the flu to others.    Talk with your doctor before getting a flu vaccine after taking this drug. Talk with your doctor before you take this drug if you have just gotten a flu vaccine.    Tell your doctor if you are pregnant or plan on getting pregnant. You will " need to talk about the benefits and risks of using this drug while you are pregnant.    Tell your doctor if you are breast-feeding. You will need to talk about any risks to your baby.    Liquid (suspension):    This drug has sorbitol in it and may lead to upset stomach and diarrhea in people who have fructose intolerance. Talk with the doctor.    What are some side effects that I need to call my doctor about right away?    WARNING/CAUTION: Even though it may be rare, some people may have very bad and sometimes deadly side effects when taking a drug. Tell your doctor or get medical help right away if you have any of the following signs or symptoms that may be related to a very bad side effect:    Signs of an allergic reaction, like rash; hives; itching; red, swollen, blistered, or peeling skin with or without fever; wheezing; tightness in the chest or throat; trouble breathing, swallowing, or talking; unusual hoarseness; or swelling of the mouth, face, lips, tongue, or throat.    People with the flu can have nervous system problems and behavior problems that can lead to death. The chance may be higher in children. Call your doctor right away if you have change in thinking clearly and with logic, change in the way you act, speech problems, shakiness, seizures, or hallucinations.    A very bad skin reaction (Sanchez-Armando syndrome/toxic epidermal necrolysis) may happen. It can cause very bad health problems that may not go away, and sometimes death. Get medical help right away if you have signs like red, swollen, blistered, or peeling skin (with or without fever); red or irritated eyes; or sores in your mouth, throat, nose, or eyes.    What are some other side effects of this drug?    All drugs may cause side effects. However, many people have no side effects or only have minor side effects. Call your doctor or get medical help if any of these side effects or any other side effects bother you or do not go away:     Upset stomach or throwing up.    Diarrhea.    Headache.    These are not all of the side effects that may occur. If you have questions about side effects, call your doctor. Call your doctor for medical advice about side effects.    You may report side effects to your national health agency.    How is this drug best taken?    Use this drug as ordered by your doctor. Read all information given to you. Follow all instructions closely.    All products:    Take with or without food. Take with food if it causes an upset stomach.    Keep taking this drug as you have been told by your doctor or other health care provider, even if you feel well.    Capsules:    If you have trouble swallowing this drug, talk with your doctor. If your doctor tells you to, you may mix contents of the capsule with a sweet liquid like chocolate syrup, caramel topping, corn syrup, or light brown sugar dissolved in water.    A liquid (suspension) can be made from the capsules if needed. Talk with the doctor or pharmacist.    Liquid (suspension):    Shake well before use.    Measure liquid doses carefully. Use the measuring device that comes with this drug. If there is none, ask the pharmacist for a device to measure this drug.    What do I do if I miss a dose?    Take a missed dose as soon as you think about it.    If it is less than 2 hours until your next dose, skip the missed dose and go back to your normal time.    Do not take 2 doses at the same time or extra doses.    How do I store and/or throw out this drug?    Capsules:    Store capsules at room temperature.    Liquid (suspension):    Store liquid in a refrigerator. Do not freeze. Throw away any part not used after 17 days.    You may also store at room temperature. If you do, throw away any part not used after 10 days.    All products:    Store in a dry place. Do not store in a bathroom.    Keep all drugs in a safe place. Keep all drugs out of the reach of children and pets.    Throw away  unused or  drugs. Do not flush down a toilet or pour down a drain unless you are told to do so. Check with your pharmacist if you have questions about the best way to throw out drugs. There may be drug take-back programs in your area.    General drug facts    If your symptoms or health problems do not get better or if they become worse, call your doctor.    Do not share your drugs with others and do not take anyone else's drugs.    Some drugs may have another patient information leaflet. If you have any questions about this drug, please talk with your doctor, nurse, pharmacist, or other health care provider.    If you think there has been an overdose, call your poison control center or get medical care right away. Be ready to tell or show what was taken, how much, and when it happened.

## 2020-08-28 RX ORDER — DESOGESTREL AND ETHINYL ESTRADIOL 21-5 (28)
1 KIT ORAL DAILY
Qty: 30 TABLET | Refills: 11 | Status: SHIPPED | OUTPATIENT
Start: 2020-08-28 | End: 2020-09-01 | Stop reason: SDUPTHER

## 2020-09-01 RX ORDER — DESOGESTREL AND ETHINYL ESTRADIOL 21-5 (28)
1 KIT ORAL DAILY
Qty: 30 TABLET | Refills: 11 | Status: SHIPPED | OUTPATIENT
Start: 2020-09-01 | End: 2022-04-07

## 2020-10-30 ENCOUNTER — OFFICE VISIT (OUTPATIENT)
Dept: FAMILY MEDICINE | Facility: CLINIC | Age: 30
End: 2020-10-30
Payer: COMMERCIAL

## 2020-10-30 ENCOUNTER — CLINICAL SUPPORT (OUTPATIENT)
Dept: FAMILY MEDICINE | Facility: CLINIC | Age: 30
End: 2020-10-30
Payer: COMMERCIAL

## 2020-10-30 DIAGNOSIS — J06.9 UPPER RESPIRATORY TRACT INFECTION, UNSPECIFIED TYPE: Primary | ICD-10-CM

## 2020-10-30 DIAGNOSIS — R05.9 COUGH: ICD-10-CM

## 2020-10-30 PROCEDURE — U0003 INFECTIOUS AGENT DETECTION BY NUCLEIC ACID (DNA OR RNA); SEVERE ACUTE RESPIRATORY SYNDROME CORONAVIRUS 2 (SARS-COV-2) (CORONAVIRUS DISEASE [COVID-19]), AMPLIFIED PROBE TECHNIQUE, MAKING USE OF HIGH THROUGHPUT TECHNOLOGIES AS DESCRIBED BY CMS-2020-01-R: HCPCS

## 2020-10-30 PROCEDURE — 99213 PR OFFICE/OUTPT VISIT, EST, LEVL III, 20-29 MIN: ICD-10-PCS | Mod: 95,,, | Performed by: NURSE PRACTITIONER

## 2020-10-30 PROCEDURE — 99213 OFFICE O/P EST LOW 20 MIN: CPT | Mod: 95,,, | Performed by: NURSE PRACTITIONER

## 2020-10-30 RX ORDER — AZITHROMYCIN 250 MG/1
TABLET, FILM COATED ORAL
Qty: 6 TABLET | Refills: 0 | Status: SHIPPED | OUTPATIENT
Start: 2020-10-30 | End: 2020-11-04

## 2020-10-30 NOTE — PATIENT INSTRUCTIONS
Hydrate well  Rest  Report to ER immediately if symptoms worsen  F/U with PCP    Instructions for Patients with Confirmed or Suspected COVID-19    If you are awaiting your test result, you will either be called or it will be released to the patient portal.  If you have any questions about your test, please visit [email or web address] or call our COVID-19 information line at [phone].      Instructions for non-hospitalized or discharged patients with confirmed or suspected COVID-19:       Stay home except to get medical care.    Separate yourself from other people and animals in your home.    Call ahead before visiting your doctor.    Wear a face mask.    Cover your coughs and sneezes.    Clean your hands often.    Avoid sharing personal household items.    Clean all high-touch surfaces every day.    Monitor your symptoms. Seek prompt medical attention if your illness is worsening (e.g., difficulty breathing). Before seeking care, call your healthcare provider.    If you have a medical emergency and must call 911, notify the dispatcher that you have or are being evaluated for COVID-19. If possible, put on a face mask before emergency medical services arrive.    Use the following symptom-based strategy to return to normal activity following a suspected or confirmed case of COVID-19. Continue isolation until:   o At least 3 days (72 hours) have passed since recovery defined as resolution of fever without the use of fever-reducing medications and improvement in respiratory symptoms (e.g. cough, shortness of breath), and   o At least 10 days have passed since the first positive test.       As one of the next steps, you will receive a call or text from the [place] Department of Health ([facility]) COVID-19 Tracing Team. See the contact information below so you know not to ignore the health departments call. It is important that you contact them back immediately so they can help.     Contact Tracer  Number:  [phone]  Caller ID for most carriers: [facility]    What is contact tracing?   Contact tracing is a process that helps identify everyone who has been in close contact with an infected person. Contact tracers let those people know they may have been exposed and guide them on next steps. Confidentiality is important for everyone; no one will be told who may have exposed them to the virus.   Your involvement is important. The more we know about where and how this virus is spreading, the better chance we have at stopping it from spreading further.  What does exposure mean?   Exposure means you have been within 6 feet for more than 15 minutes with a person who has or had COVID-19.  What kind of questions do the contact tracers ask?   A contact tracer will confirm your basic contact information including name, address, phone number, and next of kin, as well as asking about any symptoms you may have had. Theyll also ask you how you think you may have gotten sick, such as places where you may have been exposed to the virus, and people you were with. Those names will never be shared with anyone outside of that call, and will only be used to help trace and stop the spread of the virus.   I have privacy concerns. How will the state use my information?   Your privacy about your health is important. All calls are completed using call centers that use the appropriate health privacy protection measures (HIPAA compliance), meaning that your patient information is safe. No one will ever ask you any questions related to immigration status. Your health comes first.   Do I have to participate?   You do not have to participate, but we strongly encourage you to. Contact tracing can help us catch and control new outbreaks as theyre developing to keep your friends and family safe.   What if I dont hear from anyone?   If you dont receive a call within 24 hours, you can call the number above right away to  inquire about your status. That line is open from 8:00 am - 8:00 p.m., 7 days a week.  Contact tracing saves lives! Together, we have the power to beat this virus and keep our loved ones and neighbors safe.       Instructions for household members, intimate partners and caregivers in a non-healthcare setting of a patient with confirmed or suspected COVID-19:         Close contacts should monitor their health and call their healthcare provider right away if they develop symptoms suggestive of COVID-19 (e.g., fever, cough, shortness of breath).    Stay home except to get medical care. Separate yourself from other people and animals in the home.   Monitor the patients symptoms. If the patient is getting sicker, call his or her healthcare provider. If the patient has a medical emergency and you need to call 911, notify the dispatch personnel that the patient has or is being evaluated for COVID-19.    Wear a facemask when around other people such as sharing a room or vehicle and before entering a healthcare provider's office.   Cover coughs and sneezes with a tissue. Throw used tissues in a lined trash can immediately and wash hands.   Clean hands often with soap and water for at least 20 seconds or with an alcohol-based hand , rubbing hands together until they feel dry. Avoid touching your eyes, nose, and mouth with unwashed hands.   Clean all high-touch; surfaces every day, including counters, tabletops, doorknobs, bathroom fixtures, toilets, phones, keyboards, tablets, bedside tables, etc. Use a household cleaning spray or wipe according to label instructions.   Avoid sharing personal household items such as dishes, drinking glasses, cups, towels, bedding, etc. After these items are used, they should be washed thoroughly with soap and water.   Continue isolation until:   At least 3 days (72 hours) have passed since recovery defined as resolution of fever without the use of fever-reducing  medications and improvement in respiratory symptoms (e.g. cough, shortness of breath), and    At least 10 days have passed since the patients first positive test.    https://www.cdc.gov/coronavirus/2019-ncov/your-health/index.htm

## 2020-10-31 LAB — SARS-COV-2 RNA RESP QL NAA+PROBE: NOT DETECTED

## 2020-10-31 NOTE — PROGRESS NOTES
Subjective:       Patient ID: Violette Esposito is a 30 y.o. female.    Chief Complaint: No chief complaint on file.  The patient location is: [place]  The chief complaint leading to consultation is: Cough    Visit type: audiovisual    Face to Face time with patient: 15 min  minutes of total time spent on the encounter, which includes face to face time and non-face to face time preparing to see the patient (eg, review of tests), Obtaining and/or reviewing separately obtained history, Documenting clinical information in the electronic or other health record, Independently interpreting results (not separately reported) and communicating results to the patient/family/caregiver, or Care coordination (not separately reported).     Each patient to whom he or she provides medical services by telemedicine is:  (1) informed of the relationship between the physician and patient and the respective role of any other health care provider with respect to management of the patient; and (2) notified that he or she may decline to receive medical services by telemedicine and may withdraw from such care at any time.    Notes:     Cough  This is a new problem. The current episode started in the past 7 days. The problem has been unchanged. The cough is productive of sputum. Associated symptoms include headaches, nasal congestion and rhinorrhea. Pertinent negatives include no chest pain, chills, ear congestion, ear pain, fever, heartburn, hemoptysis, myalgias, postnasal drip, rash, sore throat, shortness of breath, sweats, weight loss or wheezing. Nothing aggravates the symptoms. Treatments tried: xyzal. The treatment provided no relief.   History reviewed. No pertinent past medical history.  Social History     Socioeconomic History    Marital status:      Spouse name: Not on file    Number of children: Not on file    Years of education: Not on file    Highest education level: Not on file   Occupational History    Not on  file   Social Needs    Financial resource strain: Not on file    Food insecurity     Worry: Not on file     Inability: Not on file    Transportation needs     Medical: Not on file     Non-medical: Not on file   Tobacco Use    Smoking status: Never Smoker    Smokeless tobacco: Never Used   Substance and Sexual Activity    Alcohol use: Yes    Drug use: No    Sexual activity: Yes     Birth control/protection: Pill   Lifestyle    Physical activity     Days per week: Not on file     Minutes per session: Not on file    Stress: Not at all   Relationships    Social connections     Talks on phone: Not on file     Gets together: Not on file     Attends Gnosticist service: Not on file     Active member of club or organization: Not on file     Attends meetings of clubs or organizations: Not on file     Relationship status: Not on file   Other Topics Concern    Not on file   Social History Narrative    Not on file     Past Surgical History:   Procedure Laterality Date    WISDOM TOOTH EXTRACTION         Review of Systems   Constitutional: Negative.  Negative for chills, fever and weight loss.   HENT: Positive for rhinorrhea. Negative for ear pain, postnasal drip and sore throat.    Eyes: Negative.    Respiratory: Positive for cough. Negative for hemoptysis, shortness of breath and wheezing.    Cardiovascular: Negative.  Negative for chest pain.   Gastrointestinal: Negative.  Negative for heartburn.   Endocrine: Negative.    Genitourinary: Negative.    Musculoskeletal: Negative.  Negative for myalgias.   Integumentary:  Negative for rash. Negative.   Allergic/Immunologic: Negative.    Neurological: Positive for headaches.   Psychiatric/Behavioral: Negative.          Objective:      Physical Exam  Constitutional:       Appearance: Normal appearance.   Neurological:      Mental Status: She is alert.         Assessment:       1. Upper respiratory tract infection, unspecified type    2. Cough        Plan:            Diagnoses and all orders for this visit:    Upper respiratory tract infection, unspecified type  Cough  -     COVID-19 Routine Screening  -     azithromycin (Z-HUMERA) 250 MG tablet; Take 2 tablets by mouth on day 1; Take 1 tablet by mouth on days 2-5   Zyrtec OTC as directed  Saline nasal spray OTC as directed  Hydrate well  Rest  Report to ER immediately if symptoms worsen  F/U with PCP

## 2021-03-08 ENCOUNTER — OFFICE VISIT (OUTPATIENT)
Dept: FAMILY MEDICINE | Facility: CLINIC | Age: 31
End: 2021-03-08
Payer: COMMERCIAL

## 2021-03-08 ENCOUNTER — PATIENT MESSAGE (OUTPATIENT)
Dept: FAMILY MEDICINE | Facility: CLINIC | Age: 31
End: 2021-03-08

## 2021-03-08 VITALS
SYSTOLIC BLOOD PRESSURE: 134 MMHG | WEIGHT: 120.19 LBS | HEART RATE: 85 BPM | HEIGHT: 62 IN | BODY MASS INDEX: 22.12 KG/M2 | TEMPERATURE: 97 F | DIASTOLIC BLOOD PRESSURE: 87 MMHG

## 2021-03-08 DIAGNOSIS — R21 RASH: Primary | ICD-10-CM

## 2021-03-08 PROCEDURE — 99999 PR PBB SHADOW E&M-EST. PATIENT-LVL III: CPT | Mod: PBBFAC,,, | Performed by: NURSE PRACTITIONER

## 2021-03-08 PROCEDURE — 99213 OFFICE O/P EST LOW 20 MIN: CPT | Mod: S$GLB,,, | Performed by: NURSE PRACTITIONER

## 2021-03-08 PROCEDURE — 96372 THER/PROPH/DIAG INJ SC/IM: CPT | Mod: PBBFAC,PO

## 2021-03-08 PROCEDURE — 99999 PR PBB SHADOW E&M-EST. PATIENT-LVL III: ICD-10-PCS | Mod: PBBFAC,,, | Performed by: NURSE PRACTITIONER

## 2021-03-08 PROCEDURE — 99213 OFFICE O/P EST LOW 20 MIN: CPT | Mod: PBBFAC,PO | Performed by: NURSE PRACTITIONER

## 2021-03-08 PROCEDURE — 99213 PR OFFICE/OUTPT VISIT, EST, LEVL III, 20-29 MIN: ICD-10-PCS | Mod: S$GLB,,, | Performed by: NURSE PRACTITIONER

## 2021-03-08 RX ORDER — CEPHALEXIN 500 MG/1
500 CAPSULE ORAL EVERY 12 HOURS
Qty: 14 CAPSULE | Refills: 0 | Status: SHIPPED | OUTPATIENT
Start: 2021-03-08 | End: 2021-03-15

## 2021-03-08 RX ORDER — TRIAMCINOLONE ACETONIDE 5 MG/G
CREAM TOPICAL 2 TIMES DAILY
Qty: 30 G | Refills: 0 | Status: SHIPPED | OUTPATIENT
Start: 2021-03-08 | End: 2021-03-29

## 2021-03-08 RX ORDER — HYDROXYZINE PAMOATE 25 MG/1
25 CAPSULE ORAL EVERY 8 HOURS PRN
Qty: 15 CAPSULE | Refills: 0 | Status: SHIPPED | OUTPATIENT
Start: 2021-03-08 | End: 2021-03-13

## 2021-03-08 RX ORDER — DEXAMETHASONE SODIUM PHOSPHATE 4 MG/ML
8 INJECTION, SOLUTION INTRA-ARTICULAR; INTRALESIONAL; INTRAMUSCULAR; INTRAVENOUS; SOFT TISSUE ONCE
Status: COMPLETED | OUTPATIENT
Start: 2021-03-08 | End: 2021-03-08

## 2021-03-08 RX ORDER — DIAPER,BRIEF,INFANT-TODD,DISP
EACH MISCELLANEOUS 2 TIMES DAILY
COMMUNITY
End: 2021-03-08

## 2021-03-08 RX ADMIN — DEXAMETHASONE SODIUM PHOSPHATE 8 MG: 4 INJECTION, SOLUTION INTRA-ARTICULAR; INTRALESIONAL; INTRAMUSCULAR; INTRAVENOUS; SOFT TISSUE at 07:03

## 2021-03-26 ENCOUNTER — PATIENT MESSAGE (OUTPATIENT)
Dept: FAMILY MEDICINE | Facility: CLINIC | Age: 31
End: 2021-03-26

## 2021-03-29 ENCOUNTER — OFFICE VISIT (OUTPATIENT)
Dept: FAMILY MEDICINE | Facility: CLINIC | Age: 31
End: 2021-03-29
Attending: FAMILY MEDICINE
Payer: COMMERCIAL

## 2021-03-29 ENCOUNTER — LAB VISIT (OUTPATIENT)
Dept: LAB | Facility: HOSPITAL | Age: 31
End: 2021-03-29
Attending: FAMILY MEDICINE
Payer: COMMERCIAL

## 2021-03-29 VITALS
SYSTOLIC BLOOD PRESSURE: 110 MMHG | BODY MASS INDEX: 21.53 KG/M2 | OXYGEN SATURATION: 99 % | DIASTOLIC BLOOD PRESSURE: 80 MMHG | HEART RATE: 93 BPM | HEIGHT: 62 IN | WEIGHT: 117 LBS

## 2021-03-29 DIAGNOSIS — Z01.419 WELL WOMAN EXAM WITH ROUTINE GYNECOLOGICAL EXAM: ICD-10-CM

## 2021-03-29 DIAGNOSIS — Z01.419 WELL WOMAN EXAM WITH ROUTINE GYNECOLOGICAL EXAM: Primary | ICD-10-CM

## 2021-03-29 LAB
ALBUMIN SERPL BCP-MCNC: 4.1 G/DL (ref 3.5–5.2)
ALP SERPL-CCNC: 46 U/L (ref 55–135)
ALT SERPL W/O P-5'-P-CCNC: 16 U/L (ref 10–44)
ANION GAP SERPL CALC-SCNC: 8 MMOL/L (ref 8–16)
AST SERPL-CCNC: 18 U/L (ref 10–40)
BACTERIA #/AREA URNS AUTO: NORMAL /HPF
BASOPHILS # BLD AUTO: 0.07 K/UL (ref 0–0.2)
BASOPHILS NFR BLD: 0.8 % (ref 0–1.9)
BILIRUB SERPL-MCNC: 0.4 MG/DL (ref 0.1–1)
BILIRUB UR QL STRIP: NEGATIVE
BUN SERPL-MCNC: 9 MG/DL (ref 6–20)
CALCIUM SERPL-MCNC: 9 MG/DL (ref 8.7–10.5)
CHLORIDE SERPL-SCNC: 107 MMOL/L (ref 95–110)
CHOLEST SERPL-MCNC: 214 MG/DL (ref 120–199)
CHOLEST/HDLC SERPL: 2.9 {RATIO} (ref 2–5)
CLARITY UR REFRACT.AUTO: CLEAR
CO2 SERPL-SCNC: 23 MMOL/L (ref 23–29)
COLOR UR AUTO: COLORLESS
CREAT SERPL-MCNC: 0.8 MG/DL (ref 0.5–1.4)
DIFFERENTIAL METHOD: NORMAL
EOSINOPHIL # BLD AUTO: 0.1 K/UL (ref 0–0.5)
EOSINOPHIL NFR BLD: 1 % (ref 0–8)
ERYTHROCYTE [DISTWIDTH] IN BLOOD BY AUTOMATED COUNT: 12.7 % (ref 11.5–14.5)
EST. GFR  (AFRICAN AMERICAN): >60 ML/MIN/1.73 M^2
EST. GFR  (NON AFRICAN AMERICAN): >60 ML/MIN/1.73 M^2
GLUCOSE SERPL-MCNC: 73 MG/DL (ref 70–110)
GLUCOSE UR QL STRIP: NEGATIVE
HCT VFR BLD AUTO: 40.8 % (ref 37–48.5)
HDLC SERPL-MCNC: 73 MG/DL (ref 40–75)
HDLC SERPL: 34.1 % (ref 20–50)
HGB BLD-MCNC: 13.3 G/DL (ref 12–16)
HGB UR QL STRIP: NEGATIVE
IMM GRANULOCYTES # BLD AUTO: 0.02 K/UL (ref 0–0.04)
IMM GRANULOCYTES NFR BLD AUTO: 0.2 % (ref 0–0.5)
KETONES UR QL STRIP: NEGATIVE
LDLC SERPL CALC-MCNC: 126.8 MG/DL (ref 63–159)
LEUKOCYTE ESTERASE UR QL STRIP: ABNORMAL
LYMPHOCYTES # BLD AUTO: 2.7 K/UL (ref 1–4.8)
LYMPHOCYTES NFR BLD: 30.8 % (ref 18–48)
MCH RBC QN AUTO: 28.6 PG (ref 27–31)
MCHC RBC AUTO-ENTMCNC: 32.6 G/DL (ref 32–36)
MCV RBC AUTO: 88 FL (ref 82–98)
MICROSCOPIC COMMENT: NORMAL
MONOCYTES # BLD AUTO: 0.6 K/UL (ref 0.3–1)
MONOCYTES NFR BLD: 7 % (ref 4–15)
NEUTROPHILS # BLD AUTO: 5.2 K/UL (ref 1.8–7.7)
NEUTROPHILS NFR BLD: 60.2 % (ref 38–73)
NITRITE UR QL STRIP: NEGATIVE
NONHDLC SERPL-MCNC: 141 MG/DL
NRBC BLD-RTO: 0 /100 WBC
PH UR STRIP: 7 [PH] (ref 5–8)
PLATELET # BLD AUTO: 291 K/UL (ref 150–450)
PMV BLD AUTO: 11.2 FL (ref 9.2–12.9)
POTASSIUM SERPL-SCNC: 4.6 MMOL/L (ref 3.5–5.1)
PROT SERPL-MCNC: 7.1 G/DL (ref 6–8.4)
PROT UR QL STRIP: NEGATIVE
RBC # BLD AUTO: 4.65 M/UL (ref 4–5.4)
RBC #/AREA URNS AUTO: 1 /HPF (ref 0–4)
SODIUM SERPL-SCNC: 138 MMOL/L (ref 136–145)
SP GR UR STRIP: 1 (ref 1–1.03)
SQUAMOUS #/AREA URNS AUTO: 1 /HPF
TRIGL SERPL-MCNC: 71 MG/DL (ref 30–150)
TSH SERPL DL<=0.005 MIU/L-ACNC: 1.57 UIU/ML (ref 0.4–4)
URN SPEC COLLECT METH UR: ABNORMAL
WBC # BLD AUTO: 8.7 K/UL (ref 3.9–12.7)
WBC #/AREA URNS AUTO: 2 /HPF (ref 0–5)

## 2021-03-29 PROCEDURE — 36415 COLL VENOUS BLD VENIPUNCTURE: CPT | Mod: PO | Performed by: FAMILY MEDICINE

## 2021-03-29 PROCEDURE — 1126F AMNT PAIN NOTED NONE PRSNT: CPT | Mod: S$GLB,,, | Performed by: FAMILY MEDICINE

## 2021-03-29 PROCEDURE — 99395 PR PREVENTIVE VISIT,EST,18-39: ICD-10-PCS | Mod: S$GLB,,, | Performed by: FAMILY MEDICINE

## 2021-03-29 PROCEDURE — 80053 COMPREHEN METABOLIC PANEL: CPT | Performed by: FAMILY MEDICINE

## 2021-03-29 PROCEDURE — 86703 HIV-1/HIV-2 1 RESULT ANTBDY: CPT | Performed by: FAMILY MEDICINE

## 2021-03-29 PROCEDURE — 81001 URINALYSIS AUTO W/SCOPE: CPT | Performed by: FAMILY MEDICINE

## 2021-03-29 PROCEDURE — 85025 COMPLETE CBC W/AUTO DIFF WBC: CPT | Performed by: FAMILY MEDICINE

## 2021-03-29 PROCEDURE — 3008F PR BODY MASS INDEX (BMI) DOCUMENTED: ICD-10-PCS | Mod: CPTII,S$GLB,, | Performed by: FAMILY MEDICINE

## 2021-03-29 PROCEDURE — 99395 PREV VISIT EST AGE 18-39: CPT | Mod: S$GLB,,, | Performed by: FAMILY MEDICINE

## 2021-03-29 PROCEDURE — 80061 LIPID PANEL: CPT | Performed by: FAMILY MEDICINE

## 2021-03-29 PROCEDURE — 99999 PR PBB SHADOW E&M-EST. PATIENT-LVL III: CPT | Mod: PBBFAC,,, | Performed by: FAMILY MEDICINE

## 2021-03-29 PROCEDURE — 88175 CYTOPATH C/V AUTO FLUID REDO: CPT | Performed by: FAMILY MEDICINE

## 2021-03-29 PROCEDURE — 84443 ASSAY THYROID STIM HORMONE: CPT | Performed by: FAMILY MEDICINE

## 2021-03-29 PROCEDURE — 3008F BODY MASS INDEX DOCD: CPT | Mod: CPTII,S$GLB,, | Performed by: FAMILY MEDICINE

## 2021-03-29 PROCEDURE — 86803 HEPATITIS C AB TEST: CPT | Performed by: FAMILY MEDICINE

## 2021-03-29 PROCEDURE — 1126F PR PAIN SEVERITY QUANTIFIED, NO PAIN PRESENT: ICD-10-PCS | Mod: S$GLB,,, | Performed by: FAMILY MEDICINE

## 2021-03-29 PROCEDURE — 99999 PR PBB SHADOW E&M-EST. PATIENT-LVL III: ICD-10-PCS | Mod: PBBFAC,,, | Performed by: FAMILY MEDICINE

## 2021-03-30 LAB
HCV AB SERPL QL IA: NEGATIVE
HIV 1+2 AB+HIV1 P24 AG SERPL QL IA: NEGATIVE

## 2021-04-01 LAB
CLINICAL INFO: NORMAL
CYTO CVX: NORMAL
CYTOLOGIST CVX/VAG CYTO: NORMAL
CYTOLOGY CMNT CVX/VAG CYTO-IMP: NORMAL
CYTOLOGY PAP THIN PREP EXPLANATION: NORMAL
DATE OF PREVIOUS PAP: NORMAL
DATE PREVIOUS BX: NO
LMP START DATE: NORMAL
SPECIMEN SOURCE CVX/VAG CYTO: NORMAL
STAT OF ADQ CVX/VAG CYTO-IMP: NORMAL

## 2021-06-11 ENCOUNTER — OFFICE VISIT (OUTPATIENT)
Dept: FAMILY MEDICINE | Facility: CLINIC | Age: 31
End: 2021-06-11
Attending: FAMILY MEDICINE
Payer: COMMERCIAL

## 2021-06-11 VITALS — HEIGHT: 62 IN | TEMPERATURE: 99 F | WEIGHT: 112 LBS | BODY MASS INDEX: 20.61 KG/M2

## 2021-06-11 DIAGNOSIS — R19.5 LOOSE STOOLS: Primary | ICD-10-CM

## 2021-06-11 PROCEDURE — 3008F PR BODY MASS INDEX (BMI) DOCUMENTED: ICD-10-PCS | Mod: CPTII,,, | Performed by: FAMILY MEDICINE

## 2021-06-11 PROCEDURE — 99213 OFFICE O/P EST LOW 20 MIN: CPT | Mod: 95,,, | Performed by: FAMILY MEDICINE

## 2021-06-11 PROCEDURE — 1126F PR PAIN SEVERITY QUANTIFIED, NO PAIN PRESENT: ICD-10-PCS | Mod: ,,, | Performed by: FAMILY MEDICINE

## 2021-06-11 PROCEDURE — 99213 PR OFFICE/OUTPT VISIT, EST, LEVL III, 20-29 MIN: ICD-10-PCS | Mod: 95,,, | Performed by: FAMILY MEDICINE

## 2021-06-11 PROCEDURE — 3008F BODY MASS INDEX DOCD: CPT | Mod: CPTII,,, | Performed by: FAMILY MEDICINE

## 2021-06-11 PROCEDURE — 1126F AMNT PAIN NOTED NONE PRSNT: CPT | Mod: ,,, | Performed by: FAMILY MEDICINE

## 2021-06-15 ENCOUNTER — LAB VISIT (OUTPATIENT)
Dept: LAB | Facility: HOSPITAL | Age: 31
End: 2021-06-15
Attending: INTERNAL MEDICINE
Payer: COMMERCIAL

## 2021-06-15 ENCOUNTER — OFFICE VISIT (OUTPATIENT)
Dept: GASTROENTEROLOGY | Facility: CLINIC | Age: 31
End: 2021-06-15
Payer: COMMERCIAL

## 2021-06-15 VITALS
HEART RATE: 80 BPM | WEIGHT: 115.19 LBS | BODY MASS INDEX: 21.2 KG/M2 | HEIGHT: 62 IN | SYSTOLIC BLOOD PRESSURE: 118 MMHG | DIASTOLIC BLOOD PRESSURE: 70 MMHG

## 2021-06-15 DIAGNOSIS — Z83.79 FAMILY HISTORY OF CELIAC DISEASE: ICD-10-CM

## 2021-06-15 DIAGNOSIS — R19.5 LOOSE STOOLS: ICD-10-CM

## 2021-06-15 DIAGNOSIS — R19.5 LOOSE STOOLS: Primary | ICD-10-CM

## 2021-06-15 PROCEDURE — 99999 PR PBB SHADOW E&M-EST. PATIENT-LVL III: CPT | Mod: PBBFAC,,, | Performed by: INTERNAL MEDICINE

## 2021-06-15 PROCEDURE — 99204 OFFICE O/P NEW MOD 45 MIN: CPT | Mod: S$GLB,,, | Performed by: INTERNAL MEDICINE

## 2021-06-15 PROCEDURE — 99999 PR PBB SHADOW E&M-EST. PATIENT-LVL III: ICD-10-PCS | Mod: PBBFAC,,, | Performed by: INTERNAL MEDICINE

## 2021-06-15 PROCEDURE — 3008F PR BODY MASS INDEX (BMI) DOCUMENTED: ICD-10-PCS | Mod: CPTII,S$GLB,, | Performed by: INTERNAL MEDICINE

## 2021-06-15 PROCEDURE — 1126F PR PAIN SEVERITY QUANTIFIED, NO PAIN PRESENT: ICD-10-PCS | Mod: S$GLB,,, | Performed by: INTERNAL MEDICINE

## 2021-06-15 PROCEDURE — 99204 PR OFFICE/OUTPT VISIT, NEW, LEVL IV, 45-59 MIN: ICD-10-PCS | Mod: S$GLB,,, | Performed by: INTERNAL MEDICINE

## 2021-06-15 PROCEDURE — 1126F AMNT PAIN NOTED NONE PRSNT: CPT | Mod: S$GLB,,, | Performed by: INTERNAL MEDICINE

## 2021-06-15 PROCEDURE — 3008F BODY MASS INDEX DOCD: CPT | Mod: CPTII,S$GLB,, | Performed by: INTERNAL MEDICINE

## 2021-06-15 PROCEDURE — 36415 COLL VENOUS BLD VENIPUNCTURE: CPT | Mod: PN | Performed by: INTERNAL MEDICINE

## 2021-06-15 PROCEDURE — 83516 IMMUNOASSAY NONANTIBODY: CPT | Mod: 59 | Performed by: INTERNAL MEDICINE

## 2021-06-15 RX ORDER — SODIUM, POTASSIUM,MAG SULFATES 17.5-3.13G
1 SOLUTION, RECONSTITUTED, ORAL ORAL DAILY
Qty: 1 KIT | Refills: 0 | Status: SHIPPED | OUTPATIENT
Start: 2021-06-15 | End: 2021-06-17

## 2021-06-18 LAB
GLIADIN PEPTIDE IGA SER-ACNC: 5 UNITS
GLIADIN PEPTIDE IGG SER-ACNC: 2 UNITS
IGA SERPL-MCNC: 157 MG/DL (ref 70–400)
TTG IGA SER-ACNC: 4 UNITS
TTG IGG SER-ACNC: 3 UNITS

## 2021-07-14 ENCOUNTER — TELEPHONE (OUTPATIENT)
Dept: PREADMISSION TESTING | Facility: HOSPITAL | Age: 31
End: 2021-07-14

## 2021-07-15 ENCOUNTER — ANESTHESIA EVENT (OUTPATIENT)
Dept: ENDOSCOPY | Facility: HOSPITAL | Age: 31
End: 2021-07-15
Payer: COMMERCIAL

## 2021-07-16 ENCOUNTER — HOSPITAL ENCOUNTER (OUTPATIENT)
Facility: HOSPITAL | Age: 31
Discharge: HOME | End: 2021-07-16
Attending: INTERNAL MEDICINE | Admitting: INTERNAL MEDICINE
Payer: COMMERCIAL

## 2021-07-16 ENCOUNTER — ANESTHESIA (OUTPATIENT)
Dept: ENDOSCOPY | Facility: HOSPITAL | Age: 31
End: 2021-07-16
Payer: COMMERCIAL

## 2021-07-16 DIAGNOSIS — R19.5 LOOSE STOOLS: Primary | ICD-10-CM

## 2021-07-16 DIAGNOSIS — Z83.79 FAMILY HISTORY OF CELIAC DISEASE: ICD-10-CM

## 2021-07-16 LAB
B-HCG UR QL: NEGATIVE
CTP QC/QA: YES

## 2021-07-16 PROCEDURE — 88305 TISSUE EXAM BY PATHOLOGIST: ICD-10-PCS | Mod: 26,,, | Performed by: STUDENT IN AN ORGANIZED HEALTH CARE EDUCATION/TRAINING PROGRAM

## 2021-07-16 PROCEDURE — 63600175 PHARM REV CODE 636 W HCPCS: Performed by: NURSE ANESTHETIST, CERTIFIED REGISTERED

## 2021-07-16 PROCEDURE — 88305 TISSUE EXAM BY PATHOLOGIST: CPT | Mod: 59 | Performed by: STUDENT IN AN ORGANIZED HEALTH CARE EDUCATION/TRAINING PROGRAM

## 2021-07-16 PROCEDURE — 45380 COLONOSCOPY AND BIOPSY: CPT | Performed by: INTERNAL MEDICINE

## 2021-07-16 PROCEDURE — 43239 PR EGD, FLEX, W/BIOPSY, SGL/MULTI: ICD-10-PCS | Mod: 51,,, | Performed by: INTERNAL MEDICINE

## 2021-07-16 PROCEDURE — 81025 URINE PREGNANCY TEST: CPT | Performed by: INTERNAL MEDICINE

## 2021-07-16 PROCEDURE — D9220A PRA ANESTHESIA: ICD-10-PCS | Mod: ,,, | Performed by: NURSE ANESTHETIST, CERTIFIED REGISTERED

## 2021-07-16 PROCEDURE — 43239 EGD BIOPSY SINGLE/MULTIPLE: CPT | Performed by: INTERNAL MEDICINE

## 2021-07-16 PROCEDURE — 88305 TISSUE EXAM BY PATHOLOGIST: CPT | Mod: 26,,, | Performed by: STUDENT IN AN ORGANIZED HEALTH CARE EDUCATION/TRAINING PROGRAM

## 2021-07-16 PROCEDURE — 37000008 HC ANESTHESIA 1ST 15 MINUTES: Performed by: INTERNAL MEDICINE

## 2021-07-16 PROCEDURE — 27201012 HC FORCEPS, HOT/COLD, DISP: Performed by: INTERNAL MEDICINE

## 2021-07-16 PROCEDURE — 25000003 PHARM REV CODE 250: Performed by: NURSE ANESTHETIST, CERTIFIED REGISTERED

## 2021-07-16 PROCEDURE — D9220A PRA ANESTHESIA: Mod: ,,, | Performed by: NURSE ANESTHETIST, CERTIFIED REGISTERED

## 2021-07-16 PROCEDURE — 37000009 HC ANESTHESIA EA ADD 15 MINS: Performed by: INTERNAL MEDICINE

## 2021-07-16 PROCEDURE — 45380 COLONOSCOPY AND BIOPSY: CPT | Mod: ,,, | Performed by: INTERNAL MEDICINE

## 2021-07-16 PROCEDURE — 63600175 PHARM REV CODE 636 W HCPCS: Performed by: INTERNAL MEDICINE

## 2021-07-16 PROCEDURE — 45380 PR COLONOSCOPY,BIOPSY: ICD-10-PCS | Mod: ,,, | Performed by: INTERNAL MEDICINE

## 2021-07-16 PROCEDURE — 43239 EGD BIOPSY SINGLE/MULTIPLE: CPT | Mod: 51,,, | Performed by: INTERNAL MEDICINE

## 2021-07-16 RX ORDER — SODIUM CHLORIDE 0.9 % (FLUSH) 0.9 %
10 SYRINGE (ML) INJECTION
Status: DISCONTINUED | OUTPATIENT
Start: 2021-07-16 | End: 2021-07-16 | Stop reason: HOSPADM

## 2021-07-16 RX ORDER — LIDOCAINE HYDROCHLORIDE 20 MG/ML
INJECTION INTRAVENOUS
Status: DISCONTINUED | OUTPATIENT
Start: 2021-07-16 | End: 2021-07-16

## 2021-07-16 RX ORDER — SODIUM CHLORIDE, SODIUM LACTATE, POTASSIUM CHLORIDE, CALCIUM CHLORIDE 600; 310; 30; 20 MG/100ML; MG/100ML; MG/100ML; MG/100ML
INJECTION, SOLUTION INTRAVENOUS CONTINUOUS
Status: DISCONTINUED | OUTPATIENT
Start: 2021-07-16 | End: 2021-07-16 | Stop reason: HOSPADM

## 2021-07-16 RX ORDER — PROPOFOL 10 MG/ML
VIAL (ML) INTRAVENOUS
Status: DISCONTINUED | OUTPATIENT
Start: 2021-07-16 | End: 2021-07-16

## 2021-07-16 RX ADMIN — PROPOFOL 45 MG: 10 INJECTION, EMULSION INTRAVENOUS at 12:07

## 2021-07-16 RX ADMIN — PROPOFOL 50 MG: 10 INJECTION, EMULSION INTRAVENOUS at 12:07

## 2021-07-16 RX ADMIN — Medication 100 MG: at 12:07

## 2021-07-16 RX ADMIN — PROPOFOL 60 MG: 10 INJECTION, EMULSION INTRAVENOUS at 12:07

## 2021-07-16 RX ADMIN — SODIUM CHLORIDE, SODIUM LACTATE, POTASSIUM CHLORIDE, AND CALCIUM CHLORIDE: .6; .31; .03; .02 INJECTION, SOLUTION INTRAVENOUS at 11:07

## 2021-07-16 RX ADMIN — GLYCOPYRROLATE 0.2 MG: 0.2 INJECTION, SOLUTION INTRAMUSCULAR; INTRAVITREAL at 12:07

## 2021-07-19 VITALS
WEIGHT: 111.13 LBS | HEART RATE: 89 BPM | SYSTOLIC BLOOD PRESSURE: 106 MMHG | BODY MASS INDEX: 20.45 KG/M2 | DIASTOLIC BLOOD PRESSURE: 70 MMHG | RESPIRATION RATE: 16 BRPM | HEIGHT: 62 IN | TEMPERATURE: 98 F | OXYGEN SATURATION: 98 %

## 2021-07-23 LAB
FINAL PATHOLOGIC DIAGNOSIS: NORMAL
Lab: NORMAL

## 2022-03-04 ENCOUNTER — OFFICE VISIT (OUTPATIENT)
Dept: FAMILY MEDICINE | Facility: CLINIC | Age: 32
End: 2022-03-04
Attending: FAMILY MEDICINE
Payer: COMMERCIAL

## 2022-03-04 ENCOUNTER — TELEPHONE (OUTPATIENT)
Dept: OBSTETRICS AND GYNECOLOGY | Facility: CLINIC | Age: 32
End: 2022-03-04
Payer: COMMERCIAL

## 2022-03-04 VITALS — RESPIRATION RATE: 16 BRPM | HEIGHT: 62 IN | TEMPERATURE: 99 F | WEIGHT: 115 LBS | BODY MASS INDEX: 21.16 KG/M2

## 2022-03-04 DIAGNOSIS — Z00.00 ENCOUNTER FOR MEDICAL EXAMINATION TO ESTABLISH CARE: ICD-10-CM

## 2022-03-04 DIAGNOSIS — N93.9 VAGINAL SPOTTING: Primary | ICD-10-CM

## 2022-03-04 PROCEDURE — 99213 OFFICE O/P EST LOW 20 MIN: CPT | Mod: 95,,, | Performed by: FAMILY MEDICINE

## 2022-03-04 PROCEDURE — 1160F RVW MEDS BY RX/DR IN RCRD: CPT | Mod: CPTII,95,, | Performed by: FAMILY MEDICINE

## 2022-03-04 PROCEDURE — 1159F PR MEDICATION LIST DOCUMENTED IN MEDICAL RECORD: ICD-10-PCS | Mod: CPTII,95,, | Performed by: FAMILY MEDICINE

## 2022-03-04 PROCEDURE — 3008F PR BODY MASS INDEX (BMI) DOCUMENTED: ICD-10-PCS | Mod: CPTII,95,, | Performed by: FAMILY MEDICINE

## 2022-03-04 PROCEDURE — 99213 PR OFFICE/OUTPT VISIT, EST, LEVL III, 20-29 MIN: ICD-10-PCS | Mod: 95,,, | Performed by: FAMILY MEDICINE

## 2022-03-04 PROCEDURE — 1159F MED LIST DOCD IN RCRD: CPT | Mod: CPTII,95,, | Performed by: FAMILY MEDICINE

## 2022-03-04 PROCEDURE — 3008F BODY MASS INDEX DOCD: CPT | Mod: CPTII,95,, | Performed by: FAMILY MEDICINE

## 2022-03-04 PROCEDURE — 1160F PR REVIEW ALL MEDS BY PRESCRIBER/CLIN PHARMACIST DOCUMENTED: ICD-10-PCS | Mod: CPTII,95,, | Performed by: FAMILY MEDICINE

## 2022-03-04 NOTE — TELEPHONE ENCOUNTER
----- Message from Sultana Sharma sent at 3/4/2022  8:22 AM CST -----  Regarding: Appointment Access        Name of Who is Calling: MATI MORA    Who Left The Message: MATI HARRISON JONO ABBY      What is the request in detail:  Patient called requesting to schedule a new patient appointment with this Office's first available after Tuesday 03/29/2022. Patient is being referred by Monique Boyer MD.   Please further advise.      Thank you!      Reply by [facility]: Yes      Preferred Call Back :  [phone] (L)

## 2022-03-04 NOTE — PROGRESS NOTES
Subjective:    The patient location is: home  The chief complaint leading to consultation is: vaginal spotting    Visit type: {TELE AUDIOVISUAL    Face to Face time with patient: 30  minutes of total time spent on the encounter, which includes face to face time and non-face to face time preparing to see the patient (eg, review of tests), Obtaining and/or reviewing separately obtained history, Documenting clinical information in the electronic or other health record, Independently interpreting results (not separately reported) and communicating results to the patient/family/caregiver, or Care coordination (not separately reported).         Each patient to whom he or she provides medical services by telemedicine is:  (1) informed of the relationship between the physician and patient and the respective role of any other health care provider with respect to management of the patient; and (2) notified that he or she may decline to receive medical services by telemedicine and may withdraw from such care at any time.    Notes:      Patient ID: Violette Esposito is a 31 y.o. female.    Chief Complaint: Vaginal Bleeding    HPI   Pt in virtual visit for c/o vaginal spotting with intercourse no pain no irregular periods she had normal pap last year is considering conception so she would like referral to ob/gyn to establish care and go over this.  Review of Systems   Constitutional: Negative for activity change, chills, fatigue, fever and unexpected weight change.   HENT: Negative for hearing loss, rhinorrhea and trouble swallowing.    Eyes: Negative for discharge and visual disturbance.   Respiratory: Negative for cough, chest tightness, shortness of breath and wheezing.    Cardiovascular: Negative for chest pain and palpitations.   Gastrointestinal: Negative for abdominal distention, abdominal pain, blood in stool, constipation, diarrhea and vomiting.   Endocrine: Negative for polydipsia and polyuria.  "  Genitourinary: Positive for vaginal bleeding. Negative for difficulty urinating, dysuria, hematuria and menstrual problem.   Musculoskeletal: Negative for arthralgias, joint swelling and neck pain.   Neurological: Negative for weakness and headaches.   Psychiatric/Behavioral: Negative for confusion and dysphoric mood.       Objective:     Temp 98.6 °F (37 °C)   Resp 16   Ht 5' 2" (1.575 m)   Wt 52.2 kg (115 lb)   BMI 21.03 kg/m²     Physical Exam  Constitutional:       Appearance: Normal appearance. She is not ill-appearing.   HENT:      Head: Normocephalic and atraumatic.   Eyes:      Extraocular Movements: Extraocular movements intact.   Pulmonary:      Effort: Pulmonary effort is normal. No respiratory distress.   Neurological:      General: No focal deficit present.      Mental Status: She is alert and oriented to person, place, and time.      Cranial Nerves: No cranial nerve deficit.      Coordination: Coordination normal.   Psychiatric:         Mood and Affect: Mood normal.         Thought Content: Thought content normal.         Judgment: Judgment normal.       pap wnl 3/29/2021  Assessment:       1. Vaginal spotting    2. Encounter for medical examination to establish care        Plan:     orders gyn  F/u gyn  Document vaginal bleeding in a log  rtc prn       "This note will not be shared with the patient."   "

## 2022-03-28 ENCOUNTER — PATIENT MESSAGE (OUTPATIENT)
Dept: FAMILY MEDICINE | Facility: CLINIC | Age: 32
End: 2022-03-28
Payer: COMMERCIAL

## 2022-04-06 ENCOUNTER — PATIENT MESSAGE (OUTPATIENT)
Dept: OBSTETRICS AND GYNECOLOGY | Facility: CLINIC | Age: 32
End: 2022-04-06
Payer: COMMERCIAL

## 2022-04-07 ENCOUNTER — OFFICE VISIT (OUTPATIENT)
Dept: OBSTETRICS AND GYNECOLOGY | Facility: CLINIC | Age: 32
End: 2022-04-07
Attending: OBSTETRICS & GYNECOLOGY
Payer: COMMERCIAL

## 2022-04-07 ENCOUNTER — LAB VISIT (OUTPATIENT)
Dept: LAB | Facility: OTHER | Age: 32
End: 2022-04-07
Payer: COMMERCIAL

## 2022-04-07 VITALS
SYSTOLIC BLOOD PRESSURE: 122 MMHG | BODY MASS INDEX: 22.71 KG/M2 | DIASTOLIC BLOOD PRESSURE: 78 MMHG | HEIGHT: 62 IN | WEIGHT: 123.44 LBS

## 2022-04-07 DIAGNOSIS — R79.89 ELEVATED PROLACTIN LEVEL: ICD-10-CM

## 2022-04-07 DIAGNOSIS — R79.89 ELEVATED PROLACTIN LEVEL: Primary | ICD-10-CM

## 2022-04-07 DIAGNOSIS — N93.9 VAGINAL SPOTTING: ICD-10-CM

## 2022-04-07 DIAGNOSIS — Z00.00 ENCOUNTER FOR MEDICAL EXAMINATION TO ESTABLISH CARE: ICD-10-CM

## 2022-04-07 PROBLEM — R19.5 LOOSE STOOLS: Status: RESOLVED | Noted: 2021-07-16 | Resolved: 2022-04-07

## 2022-04-07 LAB — PROLACTIN SERPL IA-MCNC: 6.6 NG/ML (ref 5.2–26.5)

## 2022-04-07 PROCEDURE — 3074F PR MOST RECENT SYSTOLIC BLOOD PRESSURE < 130 MM HG: ICD-10-PCS | Mod: CPTII,S$GLB,, | Performed by: OBSTETRICS & GYNECOLOGY

## 2022-04-07 PROCEDURE — 3078F DIAST BP <80 MM HG: CPT | Mod: CPTII,S$GLB,, | Performed by: OBSTETRICS & GYNECOLOGY

## 2022-04-07 PROCEDURE — 87481 CANDIDA DNA AMP PROBE: CPT | Mod: 59

## 2022-04-07 PROCEDURE — 99999 PR PBB SHADOW E&M-EST. PATIENT-LVL III: CPT | Mod: PBBFAC,,, | Performed by: OBSTETRICS & GYNECOLOGY

## 2022-04-07 PROCEDURE — 36415 COLL VENOUS BLD VENIPUNCTURE: CPT

## 2022-04-07 PROCEDURE — 3008F PR BODY MASS INDEX (BMI) DOCUMENTED: ICD-10-PCS | Mod: CPTII,S$GLB,, | Performed by: OBSTETRICS & GYNECOLOGY

## 2022-04-07 PROCEDURE — 3074F SYST BP LT 130 MM HG: CPT | Mod: CPTII,S$GLB,, | Performed by: OBSTETRICS & GYNECOLOGY

## 2022-04-07 PROCEDURE — 84146 ASSAY OF PROLACTIN: CPT

## 2022-04-07 PROCEDURE — 99385 PREV VISIT NEW AGE 18-39: CPT | Mod: S$GLB,,, | Performed by: OBSTETRICS & GYNECOLOGY

## 2022-04-07 PROCEDURE — 3008F BODY MASS INDEX DOCD: CPT | Mod: CPTII,S$GLB,, | Performed by: OBSTETRICS & GYNECOLOGY

## 2022-04-07 PROCEDURE — 3078F PR MOST RECENT DIASTOLIC BLOOD PRESSURE < 80 MM HG: ICD-10-PCS | Mod: CPTII,S$GLB,, | Performed by: OBSTETRICS & GYNECOLOGY

## 2022-04-07 PROCEDURE — 1159F PR MEDICATION LIST DOCUMENTED IN MEDICAL RECORD: ICD-10-PCS | Mod: CPTII,S$GLB,, | Performed by: OBSTETRICS & GYNECOLOGY

## 2022-04-07 PROCEDURE — 1159F MED LIST DOCD IN RCRD: CPT | Mod: CPTII,S$GLB,, | Performed by: OBSTETRICS & GYNECOLOGY

## 2022-04-07 PROCEDURE — 99385 PR PREVENTIVE VISIT,NEW,18-39: ICD-10-PCS | Mod: S$GLB,,, | Performed by: OBSTETRICS & GYNECOLOGY

## 2022-04-07 PROCEDURE — 99999 PR PBB SHADOW E&M-EST. PATIENT-LVL III: ICD-10-PCS | Mod: PBBFAC,,, | Performed by: OBSTETRICS & GYNECOLOGY

## 2022-04-07 PROCEDURE — 87801 DETECT AGNT MULT DNA AMPLI: CPT

## 2022-04-07 NOTE — PROGRESS NOTES
"Reason for visit: Well Woman      HPI:    31 y.o. female  presents for a well woman exam    New patient: new    Menstrual cycles: regular periods every 26-32 days, last 5-8 days, medium flow. Noted some new spotting after intercourse a few months ago.     History of abnormal paps: last 3/2021 NIELM   Abnormal or postmenopausal bleeding: no   Family history of breast or ovarian cancer: no breast cancer, unsure if ovarian however not a first degree relative (somebody on paternal side)   Any breast masses, pain, skin changes, or nipple discharge: no   Possible recent STD exposure: declined   Contraception: none at present, desires fertility      Spotting after intercourse: denies abnormal discharge, pelvic pain, dysuria.       Reviewed:    Pap: No result found, 3/2021 NIELM     Past medical, surgical, social, family, and obstetric history: Reviewed and updated in EMR.  Medications: Reviewed and updated in EMR.  Allergies: Patient has no known allergies.      Review of Systems   Constitutional: Negative for activity change, appetite change, chills and fever.   Respiratory: Negative for cough and shortness of breath.    Cardiovascular: Negative for chest pain.   Gastrointestinal: Negative for abdominal pain, constipation, diarrhea, nausea and vomiting.   Genitourinary: Negative for vaginal bleeding and vaginal discharge.   Musculoskeletal: Negative for back pain.   Neurological: Negative for numbness and headaches.   Psychiatric/Behavioral: Negative for depression. The patient is not nervous/anxious.          Vitals: /78   Ht 5' 2" (1.575 m)   Wt 56 kg (123 lb 7.3 oz)   LMP 2022   BMI 22.58 kg/m²     Physical Exam  Constitutional:       General: She is not in acute distress.     Appearance: Normal appearance. She is well-developed.   Genitourinary:      Vulva normal.      Right Labia: No rash, lesions or Bartholin's cyst.     Left Labia: No lesions, Bartholin's cyst or rash.     No vaginal " discharge or bleeding.        Right Adnexa: not tender and not full.     Left Adnexa: not tender and not full.     No cervical motion tenderness, discharge or lesion.      Uterus is not enlarged or tender.      Pelvic exam was performed with patient in the lithotomy position.   Breasts: Breasts are symmetrical.      Right: No mass, nipple discharge, skin change, tenderness or axillary adenopathy.      Left: No mass, nipple discharge, skin change, tenderness or axillary adenopathy.       Pulmonary:      Effort: No respiratory distress.   Lymphadenopathy:      Upper Body:      Right upper body: No axillary adenopathy.      Left upper body: No axillary adenopathy.   Exam conducted with a chaperone present.       Assessment and Plan:     Elevated prolactin level  -     Prolactin; Future; Expected date: 04/07/2022    Encounter for medical examination to establish care  -     Ambulatory referral/consult to Obstetrics / Gynecology    Vaginal spotting  -     Ambulatory referral/consult to Obstetrics / Gynecology  -     Vaginosis Screen by DNA Probe       Annual exam  o Breast and pelvic exam: WNL   o Patient was counseled on ASCCP guidelines for cervical cytology screening  o Cervical screening: due,   o Patient was counseled on current recommendations for breast cancer screening  o Mammogram screening: @ 40yoa unless new risk factors warrant earlier screening  o Colonoscopy screening: @ 45yoa unless new risk factors warrant earlier screening  o VitD/Ca supplementation recommendations based on age:  - <50yoa - Ca 1000mg/day, VitD 600IU/day  - 51-70yoa - Ca 1200mg/day, VitD 600IU/day  - >71yoa - Ca 1200mg/day, VitD 800IU/day   STD testing: declined.    Contraception: none, desires fertility.    Fertility concerns:   o Discussed with patient due to variation in cycle length 5+ days, and mildly elevated prolactin, possible she could be anovulatory.   o Patient desires pregnancy within the next year. Discussed taking folate  and PNV. Discussed attempting pregnancy x12 months prior to beginning infertility work up. Recommended ovulatory predictor kits to aid in fertility planning.    She was counseled to follow up with her PCP for other routine health maintenance      Jeanna Lozoya MD  PGY 1  Obstetrics and Gynecology

## 2022-04-08 LAB
BACTERIAL VAGINOSIS DNA: NEGATIVE
CANDIDA GLABRATA DNA: NEGATIVE
CANDIDA KRUSEI DNA: NEGATIVE
CANDIDA RRNA VAG QL PROBE: NEGATIVE
T VAGINALIS RRNA GENITAL QL PROBE: NEGATIVE

## 2022-05-09 ENCOUNTER — PATIENT MESSAGE (OUTPATIENT)
Dept: FAMILY MEDICINE | Facility: CLINIC | Age: 32
End: 2022-05-09
Payer: COMMERCIAL

## 2022-05-12 ENCOUNTER — OFFICE VISIT (OUTPATIENT)
Dept: FAMILY MEDICINE | Facility: CLINIC | Age: 32
End: 2022-05-12
Attending: FAMILY MEDICINE
Payer: COMMERCIAL

## 2022-05-12 ENCOUNTER — LAB VISIT (OUTPATIENT)
Dept: LAB | Facility: HOSPITAL | Age: 32
End: 2022-05-12
Attending: FAMILY MEDICINE
Payer: COMMERCIAL

## 2022-05-12 ENCOUNTER — PATIENT MESSAGE (OUTPATIENT)
Dept: FAMILY MEDICINE | Facility: CLINIC | Age: 32
End: 2022-05-12

## 2022-05-12 VITALS
DIASTOLIC BLOOD PRESSURE: 74 MMHG | BODY MASS INDEX: 22.82 KG/M2 | WEIGHT: 124 LBS | OXYGEN SATURATION: 99 % | HEIGHT: 62 IN | HEART RATE: 103 BPM | SYSTOLIC BLOOD PRESSURE: 110 MMHG

## 2022-05-12 DIAGNOSIS — Z00.00 ANNUAL PHYSICAL EXAM: ICD-10-CM

## 2022-05-12 DIAGNOSIS — Z00.00 ANNUAL PHYSICAL EXAM: Primary | ICD-10-CM

## 2022-05-12 DIAGNOSIS — M25.512 ACUTE PAIN OF LEFT SHOULDER: ICD-10-CM

## 2022-05-12 LAB
ALBUMIN SERPL BCP-MCNC: 4.3 G/DL (ref 3.5–5.2)
ALP SERPL-CCNC: 54 U/L (ref 55–135)
ALT SERPL W/O P-5'-P-CCNC: 14 U/L (ref 10–44)
ANION GAP SERPL CALC-SCNC: 8 MMOL/L (ref 8–16)
AST SERPL-CCNC: 17 U/L (ref 10–40)
BASOPHILS # BLD AUTO: 0.06 K/UL (ref 0–0.2)
BASOPHILS NFR BLD: 0.8 % (ref 0–1.9)
BILIRUB SERPL-MCNC: 0.4 MG/DL (ref 0.1–1)
BILIRUB UR QL STRIP: NEGATIVE
BUN SERPL-MCNC: 14 MG/DL (ref 6–20)
CALCIUM SERPL-MCNC: 9.8 MG/DL (ref 8.7–10.5)
CHLORIDE SERPL-SCNC: 104 MMOL/L (ref 95–110)
CHOLEST SERPL-MCNC: 229 MG/DL (ref 120–199)
CHOLEST/HDLC SERPL: 2.9 {RATIO} (ref 2–5)
CLARITY UR REFRACT.AUTO: ABNORMAL
CO2 SERPL-SCNC: 23 MMOL/L (ref 23–29)
COLOR UR AUTO: ABNORMAL
CREAT SERPL-MCNC: 0.8 MG/DL (ref 0.5–1.4)
DIFFERENTIAL METHOD: NORMAL
EOSINOPHIL # BLD AUTO: 0.2 K/UL (ref 0–0.5)
EOSINOPHIL NFR BLD: 2.3 % (ref 0–8)
ERYTHROCYTE [DISTWIDTH] IN BLOOD BY AUTOMATED COUNT: 12.7 % (ref 11.5–14.5)
EST. GFR  (AFRICAN AMERICAN): >60 ML/MIN/1.73 M^2
EST. GFR  (NON AFRICAN AMERICAN): >60 ML/MIN/1.73 M^2
GLUCOSE SERPL-MCNC: 84 MG/DL (ref 70–110)
GLUCOSE UR QL STRIP: NEGATIVE
HCT VFR BLD AUTO: 42.4 % (ref 37–48.5)
HDLC SERPL-MCNC: 79 MG/DL (ref 40–75)
HDLC SERPL: 34.5 % (ref 20–50)
HGB BLD-MCNC: 14 G/DL (ref 12–16)
HGB UR QL STRIP: NEGATIVE
IMM GRANULOCYTES # BLD AUTO: 0.01 K/UL (ref 0–0.04)
IMM GRANULOCYTES NFR BLD AUTO: 0.1 % (ref 0–0.5)
KETONES UR QL STRIP: NEGATIVE
LDLC SERPL CALC-MCNC: 138.4 MG/DL (ref 63–159)
LEUKOCYTE ESTERASE UR QL STRIP: NEGATIVE
LYMPHOCYTES # BLD AUTO: 2.4 K/UL (ref 1–4.8)
LYMPHOCYTES NFR BLD: 31.8 % (ref 18–48)
MCH RBC QN AUTO: 28.5 PG (ref 27–31)
MCHC RBC AUTO-ENTMCNC: 33 G/DL (ref 32–36)
MCV RBC AUTO: 86 FL (ref 82–98)
MONOCYTES # BLD AUTO: 0.7 K/UL (ref 0.3–1)
MONOCYTES NFR BLD: 8.9 % (ref 4–15)
NEUTROPHILS # BLD AUTO: 4.3 K/UL (ref 1.8–7.7)
NEUTROPHILS NFR BLD: 56.1 % (ref 38–73)
NITRITE UR QL STRIP: NEGATIVE
NONHDLC SERPL-MCNC: 150 MG/DL
NRBC BLD-RTO: 0 /100 WBC
PH UR STRIP: 6 [PH] (ref 5–8)
PLATELET # BLD AUTO: 313 K/UL (ref 150–450)
PMV BLD AUTO: 10.6 FL (ref 9.2–12.9)
POTASSIUM SERPL-SCNC: 4.5 MMOL/L (ref 3.5–5.1)
PROT SERPL-MCNC: 7.3 G/DL (ref 6–8.4)
PROT UR QL STRIP: NEGATIVE
RBC # BLD AUTO: 4.92 M/UL (ref 4–5.4)
SODIUM SERPL-SCNC: 135 MMOL/L (ref 136–145)
SP GR UR STRIP: 1 (ref 1–1.03)
TRIGL SERPL-MCNC: 58 MG/DL (ref 30–150)
TSH SERPL DL<=0.005 MIU/L-ACNC: 2.6 UIU/ML (ref 0.4–4)
URN SPEC COLLECT METH UR: ABNORMAL
WBC # BLD AUTO: 7.68 K/UL (ref 3.9–12.7)

## 2022-05-12 PROCEDURE — 84443 ASSAY THYROID STIM HORMONE: CPT | Performed by: FAMILY MEDICINE

## 2022-05-12 PROCEDURE — 3074F PR MOST RECENT SYSTOLIC BLOOD PRESSURE < 130 MM HG: ICD-10-PCS | Mod: CPTII,S$GLB,, | Performed by: FAMILY MEDICINE

## 2022-05-12 PROCEDURE — 3078F DIAST BP <80 MM HG: CPT | Mod: CPTII,S$GLB,, | Performed by: FAMILY MEDICINE

## 2022-05-12 PROCEDURE — 36415 COLL VENOUS BLD VENIPUNCTURE: CPT | Mod: PO | Performed by: FAMILY MEDICINE

## 2022-05-12 PROCEDURE — 80061 LIPID PANEL: CPT | Performed by: FAMILY MEDICINE

## 2022-05-12 PROCEDURE — 99395 PR PREVENTIVE VISIT,EST,18-39: ICD-10-PCS | Mod: S$GLB,,, | Performed by: FAMILY MEDICINE

## 2022-05-12 PROCEDURE — 99395 PREV VISIT EST AGE 18-39: CPT | Mod: S$GLB,,, | Performed by: FAMILY MEDICINE

## 2022-05-12 PROCEDURE — 1159F PR MEDICATION LIST DOCUMENTED IN MEDICAL RECORD: ICD-10-PCS | Mod: CPTII,S$GLB,, | Performed by: FAMILY MEDICINE

## 2022-05-12 PROCEDURE — 80053 COMPREHEN METABOLIC PANEL: CPT | Performed by: FAMILY MEDICINE

## 2022-05-12 PROCEDURE — 1159F MED LIST DOCD IN RCRD: CPT | Mod: CPTII,S$GLB,, | Performed by: FAMILY MEDICINE

## 2022-05-12 PROCEDURE — 99999 PR PBB SHADOW E&M-EST. PATIENT-LVL III: CPT | Mod: PBBFAC,,, | Performed by: FAMILY MEDICINE

## 2022-05-12 PROCEDURE — 3008F PR BODY MASS INDEX (BMI) DOCUMENTED: ICD-10-PCS | Mod: CPTII,S$GLB,, | Performed by: FAMILY MEDICINE

## 2022-05-12 PROCEDURE — 3078F PR MOST RECENT DIASTOLIC BLOOD PRESSURE < 80 MM HG: ICD-10-PCS | Mod: CPTII,S$GLB,, | Performed by: FAMILY MEDICINE

## 2022-05-12 PROCEDURE — 3074F SYST BP LT 130 MM HG: CPT | Mod: CPTII,S$GLB,, | Performed by: FAMILY MEDICINE

## 2022-05-12 PROCEDURE — 81003 URINALYSIS AUTO W/O SCOPE: CPT | Performed by: FAMILY MEDICINE

## 2022-05-12 PROCEDURE — 85025 COMPLETE CBC W/AUTO DIFF WBC: CPT | Performed by: FAMILY MEDICINE

## 2022-05-12 PROCEDURE — 99999 PR PBB SHADOW E&M-EST. PATIENT-LVL III: ICD-10-PCS | Mod: PBBFAC,,, | Performed by: FAMILY MEDICINE

## 2022-05-12 PROCEDURE — 3008F BODY MASS INDEX DOCD: CPT | Mod: CPTII,S$GLB,, | Performed by: FAMILY MEDICINE

## 2022-05-12 RX ORDER — METHYLPREDNISOLONE 4 MG/1
TABLET ORAL
Qty: 1 EACH | Refills: 0 | Status: SHIPPED | OUTPATIENT
Start: 2022-05-12 | End: 2022-06-02

## 2022-05-12 NOTE — PROGRESS NOTES
"Subjective:       Patient ID: Violette Esposito is a 31 y.o. female.    Chief Complaint: Annual Exam    HPI   Pt is here for annual exam pt is well no sob/cp no change in bowel habits no brbpr  Pt denies dysuria hematuria no abnl vaginal bleeding  Pt denies n/v/f/c/d/c no cough chest congestion no sore throat  Pt has left shoulder pain she has been exercising does not recall an acute event 4/10 takes nothing on a schedule   Review of Systems    Objective:      Physical Exam    Assessment:       1. Annual physical exam    2. Acute pain of left shoulder        Plan:     orders cmp lipid cbc tsh urine  Cont meds  Low fat diet  Graded exercise  Ice to shoulder   Start medrol dose pack  F/u sports med if needed  rtc annually and prn    Health maintenance  Lipid ordered  Flu in fall  Tetanus q 10 years  Pap with gyn  covid discussed       "This note will not be shared with the patient."     "

## 2022-05-23 ENCOUNTER — TELEPHONE (OUTPATIENT)
Dept: ORTHOPEDICS | Facility: CLINIC | Age: 32
End: 2022-05-23
Payer: COMMERCIAL

## 2022-05-23 DIAGNOSIS — M25.512 LEFT SHOULDER PAIN, UNSPECIFIED CHRONICITY: Primary | ICD-10-CM

## 2022-05-24 ENCOUNTER — TELEPHONE (OUTPATIENT)
Dept: ORTHOPEDICS | Facility: CLINIC | Age: 32
End: 2022-05-24
Payer: COMMERCIAL

## 2022-05-26 ENCOUNTER — HOSPITAL ENCOUNTER (OUTPATIENT)
Dept: RADIOLOGY | Facility: HOSPITAL | Age: 32
Discharge: HOME | End: 2022-05-26
Attending: NURSE PRACTITIONER
Payer: COMMERCIAL

## 2022-05-26 ENCOUNTER — OFFICE VISIT (OUTPATIENT)
Dept: ORTHOPEDICS | Facility: CLINIC | Age: 32
End: 2022-05-26
Payer: COMMERCIAL

## 2022-05-26 VITALS — BODY MASS INDEX: 22.8 KG/M2 | WEIGHT: 123.88 LBS | HEIGHT: 62 IN

## 2022-05-26 DIAGNOSIS — M25.512 ACUTE PAIN OF LEFT SHOULDER: ICD-10-CM

## 2022-05-26 DIAGNOSIS — M75.22 BICEPS TENDINITIS OF LEFT UPPER EXTREMITY: Primary | ICD-10-CM

## 2022-05-26 DIAGNOSIS — M25.512 LEFT SHOULDER PAIN, UNSPECIFIED CHRONICITY: ICD-10-CM

## 2022-05-26 PROCEDURE — 1159F MED LIST DOCD IN RCRD: CPT | Mod: CPTII,S$GLB,, | Performed by: NURSE PRACTITIONER

## 2022-05-26 PROCEDURE — 1160F RVW MEDS BY RX/DR IN RCRD: CPT | Mod: CPTII,S$GLB,, | Performed by: NURSE PRACTITIONER

## 2022-05-26 PROCEDURE — 1159F PR MEDICATION LIST DOCUMENTED IN MEDICAL RECORD: ICD-10-PCS | Mod: CPTII,S$GLB,, | Performed by: NURSE PRACTITIONER

## 2022-05-26 PROCEDURE — 99999 PR PBB SHADOW E&M-EST. PATIENT-LVL III: ICD-10-PCS | Mod: PBBFAC,,, | Performed by: NURSE PRACTITIONER

## 2022-05-26 PROCEDURE — 99999 PR PBB SHADOW E&M-EST. PATIENT-LVL III: CPT | Mod: PBBFAC,,, | Performed by: NURSE PRACTITIONER

## 2022-05-26 PROCEDURE — 3008F BODY MASS INDEX DOCD: CPT | Mod: CPTII,S$GLB,, | Performed by: NURSE PRACTITIONER

## 2022-05-26 PROCEDURE — 99213 PR OFFICE/OUTPT VISIT, EST, LEVL III, 20-29 MIN: ICD-10-PCS | Mod: S$GLB,,, | Performed by: NURSE PRACTITIONER

## 2022-05-26 PROCEDURE — 73030 XR SHOULDER COMPLETE 2 OR MORE VIEWS LEFT: ICD-10-PCS | Mod: 26,LT,, | Performed by: RADIOLOGY

## 2022-05-26 PROCEDURE — 73030 X-RAY EXAM OF SHOULDER: CPT | Mod: 26,LT,, | Performed by: RADIOLOGY

## 2022-05-26 PROCEDURE — 99213 OFFICE O/P EST LOW 20 MIN: CPT | Mod: S$GLB,,, | Performed by: NURSE PRACTITIONER

## 2022-05-26 PROCEDURE — 73030 X-RAY EXAM OF SHOULDER: CPT | Mod: TC,PO,LT

## 2022-05-26 PROCEDURE — 3008F PR BODY MASS INDEX (BMI) DOCUMENTED: ICD-10-PCS | Mod: CPTII,S$GLB,, | Performed by: NURSE PRACTITIONER

## 2022-05-26 PROCEDURE — 1160F PR REVIEW ALL MEDS BY PRESCRIBER/CLIN PHARMACIST DOCUMENTED: ICD-10-PCS | Mod: CPTII,S$GLB,, | Performed by: NURSE PRACTITIONER

## 2022-05-26 NOTE — PROGRESS NOTES
Answers for HPI/ROS submitted by the patient on 5/26/2022  unexpected weight change: No  appetite change : No  sleep disturbance: No  IMMUNOCOMPROMISED: No  nervous/ anxious: No  dysphoric mood: No  rash: No  visual disturbance: No  eye redness: No  eye pain: No  ear pain: No  tinnitus: No  hearing loss: No  sinus pressure : No  nosebleeds: No  enviro allergies: No  food allergies: No  cough: No  shortness of breath: No  sweating: No  dysuria: No  frequency: No  difficulty urinating: No  hematuria: No  painful intercourse: No  chest pain: No  palpitations: No  nausea: No  vomiting: No  diarrhea: No  blood in stool: No  constipation: No  headaches: No  dizziness: No  numbness: No  seizures: No  joint swelling: No  myalgia: No  weakness: No  back pain: No  Pain Chronicity: recurrent  History of trauma: No  Onset: 1 to 4 weeks ago  Frequency: intermittently  Progression since onset: gradually improving  Injury mechanism: pushing  injury location: exercising  pain- numeric: 1/10  pain location: left shoulder  pain quality: aching, dull, burning, numbing, tingling  Radiating Pain: No  Aggravating factors: exercise  fever: No  inability to bear weight: No  itching: No  joint locking: No  limited range of motion: No  stiffness: No  tingling: Yes  Treatments tried: cold, heat, rest  physical therapy: not tried  Improvement on treatment: no relief    Chief Complaint   Patient presents with    Left Shoulder - Pain       HPI:    This is a 31 y.o. who presents today complaining of left anterior shoulder pain for 2 months after no known injury or trauma; she does perform yoga but had also been slacking off on this to rest her shoulder. She reports her and spouse are trying to conceive. . Pain is aching, burning and dull. No numbness or tingling. No associated signs or symptoms.      Past Medical History:   Diagnosis Date    Digestive disorder     diarrhea      Past Surgical History:   Procedure Laterality Date    COLONOSCOPY  N/A 7/16/2021    Procedure: COLONOSCOPY;  Surgeon: Gayle Mcneill MD;  Location: [facility];  Service: Endoscopy;  Laterality: N/A;    ESOPHAGOGASTRODUODENOSCOPY N/A 7/16/2021    Procedure: EGD (ESOPHAGOGASTRODUODENOSCOPY);  Surgeon: Gayle Mcneill MD;  Location: [facility];  Service: Endoscopy;  Laterality: N/A;    WISDOM TOOTH EXTRACTION        Current Outpatient Medications on File Prior to Visit   Medication Sig Dispense Refill    Lactobacillus rhamnosus GG (CULTURELLE) 10 billion cell capsule Take 1 capsule by mouth once daily.      multivitamin capsule Take 1 capsule by mouth once daily.      methylPREDNISolone (MEDROL DOSEPACK) 4 mg tablet use as directed (Patient not taking: Reported on 5/26/2022) 1 each 0     No current facility-administered medications on file prior to visit.      Review of patient's allergies indicates:  No Known Allergies   Family History not pertinent   Social History     Socioeconomic History    Marital status:    Tobacco Use    Smoking status: Never Smoker    Smokeless tobacco: Never Used   Substance and Sexual Activity    Alcohol use: Yes    Drug use: No    Sexual activity: Yes     Birth control/protection: Pill         Review of Systems:   Constitutional:  Denies fever or chills    Eyes:  Denies change in visual acuity    HENT:  Denies nasal congestion or sore throat    Respiratory:  Denies cough or shortness of breath    Cardiovascular:  Denies chest pain or edema    GI:  Denies abdominal pain, nausea, vomiting, bloody stools or diarrhea    :  Denies dysuria    Integument:  Denies rash    Neurologic:  Denies headache, focal weakness or sensory changes    Endocrine:  Denies polyuria or polydipsia    Lymphatic:  Denies swollen glands    Psychiatric:  Denies depression or anxiety       Physical Exam:    Constitutional:  Well developed, well nourished, no acute distress, non-toxic appearance    Integument:  Well hydrated, no rash     Lymphatic:  No  lymphadenopathy noted    Neurologic:  Alert & oriented x 3,     Psychiatric:  Speech and behavior appropriate    Gi: abdomen soft  Eyes: EOMI       Bilateral Shoulder Exam    right Shoulder Exam   Shoulder exam performed same as contralateral side and is normal.    left Shoulder Exam   Tenderness   Shoulder tenderness location: to anterior bicep tendon.+TTP    Range of Motion   Forward Flexion: abnormal   External Rotation: abnormal     Muscle Strength   Supraspinatus: 4/5     Tests   Hawkin's test: positive  Impingement: positive    Other   Erythema: absent  Sensation: normal  Pulse: present        X-rays were performed today, personally reviewed by me and findings discussed with the patient.   2 views of the left shoulder show mild arthritis.               Biceps tendinitis of left upper extremity  -     Ambulatory referral/consult to Physical/Occupational Therapy; Future; Expected date: 06/02/2022    Acute pain of left shoulder  -     Ambulatory referral/consult to Sports Medicine    For now since her and spouse are trying to conceive., recommend tylenol prn, cool compresses frequently and will refer to P.T. for them to tutu and tx.   Referral to [facility].     rtc if no improvement .

## 2022-07-25 ENCOUNTER — OFFICE VISIT (OUTPATIENT)
Dept: FAMILY MEDICINE | Facility: CLINIC | Age: 32
End: 2022-07-25
Payer: COMMERCIAL

## 2022-07-25 DIAGNOSIS — U07.1 COVID-19: Primary | ICD-10-CM

## 2022-07-25 PROCEDURE — 1159F MED LIST DOCD IN RCRD: CPT | Mod: CPTII,95,, | Performed by: NURSE PRACTITIONER

## 2022-07-25 PROCEDURE — 1160F PR REVIEW ALL MEDS BY PRESCRIBER/CLIN PHARMACIST DOCUMENTED: ICD-10-PCS | Mod: CPTII,95,, | Performed by: NURSE PRACTITIONER

## 2022-07-25 PROCEDURE — 1159F PR MEDICATION LIST DOCUMENTED IN MEDICAL RECORD: ICD-10-PCS | Mod: CPTII,95,, | Performed by: NURSE PRACTITIONER

## 2022-07-25 PROCEDURE — 99213 OFFICE O/P EST LOW 20 MIN: CPT | Mod: 95,,, | Performed by: NURSE PRACTITIONER

## 2022-07-25 PROCEDURE — 99213 PR OFFICE/OUTPT VISIT, EST, LEVL III, 20-29 MIN: ICD-10-PCS | Mod: 95,,, | Performed by: NURSE PRACTITIONER

## 2022-07-25 PROCEDURE — 1160F RVW MEDS BY RX/DR IN RCRD: CPT | Mod: CPTII,95,, | Performed by: NURSE PRACTITIONER

## 2022-07-25 NOTE — PROGRESS NOTES
Subjective:       Patient ID: Violette Esposito is a 32 y.o. female.    Chief Complaint: No chief complaint on file.  The patient location is: [place]  The chief complaint leading to consultation is: Positive home COVID-19 test    Visit type: audiovisual    Face to Face time with patient: 15 min  minutes of total time spent on the encounter, which includes face to face time and non-face to face time preparing to see the patient (eg, review of tests), Obtaining and/or reviewing separately obtained history, Documenting clinical information in the electronic or other health record, Independently interpreting results (not separately reported) and communicating results to the patient/family/caregiver, or Care coordination (not separately reported).         Each patient to whom he or she provides medical services by telemedicine is:  (1) informed of the relationship between the physician and patient and the respective role of any other health care provider with respect to management of the patient; and (2) notified that he or she may decline to receive medical services by telemedicine and may withdraw from such care at any time.    Notes:     Cough  This is a new (Pt states positive home COVID-19 test today; states 8w pregnant) problem. The current episode started in the past 7 days. The problem has been unchanged. The problem occurs every few hours. The cough is non-productive and productive of sputum. Associated symptoms include ear congestion, headaches, myalgias, nasal congestion and rhinorrhea. Pertinent negatives include no chest pain, chills, ear pain, fever, heartburn, hemoptysis, postnasal drip, rash, sore throat, shortness of breath, sweats, weight loss or wheezing. She has tried rest for the symptoms. The treatment provided mild relief. Her past medical history is significant for bronchitis and environmental allergies. There is no history of asthma, bronchiectasis, COPD, emphysema or pneumonia.    Pt advised to consult with gyn before starting ANY medications; also advised to f/u with PCP ASAP.  Past Medical History:   Diagnosis Date    Digestive disorder     diarrhea     Social History     Socioeconomic History    Marital status:    Tobacco Use    Smoking status: Never Smoker    Smokeless tobacco: Never Used   Substance and Sexual Activity    Alcohol use: Yes    Drug use: No    Sexual activity: Yes     Birth control/protection: Pill     Past Surgical History:   Procedure Laterality Date    COLONOSCOPY N/A 7/16/2021    Procedure: COLONOSCOPY;  Surgeon: Gayle Mcneill MD;  Location: [facility];  Service: Endoscopy;  Laterality: N/A;    ESOPHAGOGASTRODUODENOSCOPY N/A 7/16/2021    Procedure: EGD (ESOPHAGOGASTRODUODENOSCOPY);  Surgeon: Gayle Mcneill MD;  Location: [facility];  Service: Endoscopy;  Laterality: N/A;    WISDOM TOOTH EXTRACTION         Review of Systems   Constitutional: Negative.  Negative for chills, fever and weight loss.   HENT: Positive for rhinorrhea. Negative for ear pain, postnasal drip and sore throat.    Eyes: Negative.    Respiratory: Positive for cough. Negative for hemoptysis, shortness of breath and wheezing.    Cardiovascular: Negative.  Negative for chest pain.   Gastrointestinal: Negative.  Negative for heartburn.   Endocrine: Negative.    Genitourinary: Negative.    Musculoskeletal: Positive for myalgias.   Integumentary:  Negative for rash. Negative.   Allergic/Immunologic: Positive for environmental allergies.   Neurological: Positive for headaches.   Psychiatric/Behavioral: Negative.          Objective:      Physical Exam  Constitutional:       Appearance: Normal appearance.   Neurological:      Mental Status: She is alert.         Assessment:       Problem List Items Addressed This Visit    None     Visit Diagnoses     COVID-19    -  Primary          Plan:           Diagnoses and all orders for this visit:    COVID-19  Tylenol OTC as directed  Saline  nasal spray OTC as directed  Robitussin OTC as directed  Advised to consult with gyn prior to taking any of the aforementioned medications  F/U with PCP ASAP  Report to ER immediately if symptoms worsen or persist

## 2023-03-17 ENCOUNTER — LAB VISIT (OUTPATIENT)
Dept: LAB | Facility: HOSPITAL | Age: 33
End: 2023-03-17
Attending: PHYSICIAN ASSISTANT
Payer: COMMERCIAL

## 2023-03-17 ENCOUNTER — OFFICE VISIT (OUTPATIENT)
Dept: FAMILY MEDICINE | Facility: CLINIC | Age: 33
End: 2023-03-17
Payer: COMMERCIAL

## 2023-03-17 VITALS
BODY MASS INDEX: 21.53 KG/M2 | WEIGHT: 117 LBS | SYSTOLIC BLOOD PRESSURE: 118 MMHG | HEART RATE: 106 BPM | TEMPERATURE: 98 F | DIASTOLIC BLOOD PRESSURE: 76 MMHG | HEIGHT: 62 IN

## 2023-03-17 DIAGNOSIS — Z87.59 HISTORY OF PRE-ECLAMPSIA: ICD-10-CM

## 2023-03-17 DIAGNOSIS — Z13.220 ENCOUNTER FOR LIPID SCREENING FOR CARDIOVASCULAR DISEASE: ICD-10-CM

## 2023-03-17 DIAGNOSIS — Z00.00 ENCOUNTER FOR ANNUAL HEALTH EXAMINATION: Primary | ICD-10-CM

## 2023-03-17 DIAGNOSIS — R00.0 TACHYCARDIA: ICD-10-CM

## 2023-03-17 DIAGNOSIS — Z86.32 HISTORY OF GESTATIONAL DIABETES: ICD-10-CM

## 2023-03-17 DIAGNOSIS — Z00.00 ENCOUNTER FOR ANNUAL HEALTH EXAMINATION: ICD-10-CM

## 2023-03-17 DIAGNOSIS — Z13.6 ENCOUNTER FOR LIPID SCREENING FOR CARDIOVASCULAR DISEASE: ICD-10-CM

## 2023-03-17 PROBLEM — O14.93 PRE-ECLAMPSIA IN THIRD TRIMESTER: Status: ACTIVE | Noted: 2023-03-17

## 2023-03-17 LAB
ALBUMIN SERPL BCP-MCNC: 3.8 G/DL (ref 3.5–5.2)
ALP SERPL-CCNC: 82 U/L (ref 55–135)
ALT SERPL W/O P-5'-P-CCNC: 46 U/L (ref 10–44)
ANION GAP SERPL CALC-SCNC: 11 MMOL/L (ref 8–16)
AST SERPL-CCNC: 39 U/L (ref 10–40)
BASOPHILS # BLD AUTO: 0.07 K/UL (ref 0–0.2)
BASOPHILS NFR BLD: 0.7 % (ref 0–1.9)
BILIRUB SERPL-MCNC: 0.4 MG/DL (ref 0.1–1)
BUN SERPL-MCNC: 16 MG/DL (ref 6–20)
CALCIUM SERPL-MCNC: 9.9 MG/DL (ref 8.7–10.5)
CHLORIDE SERPL-SCNC: 106 MMOL/L (ref 95–110)
CO2 SERPL-SCNC: 24 MMOL/L (ref 23–29)
CREAT SERPL-MCNC: 0.8 MG/DL (ref 0.5–1.4)
DIFFERENTIAL METHOD: NORMAL
EOSINOPHIL # BLD AUTO: 0.1 K/UL (ref 0–0.5)
EOSINOPHIL NFR BLD: 1.3 % (ref 0–8)
ERYTHROCYTE [DISTWIDTH] IN BLOOD BY AUTOMATED COUNT: 13.9 % (ref 11.5–14.5)
EST. GFR  (NO RACE VARIABLE): >60 ML/MIN/1.73 M^2
ESTIMATED AVG GLUCOSE: 103 MG/DL (ref 68–131)
GLUCOSE SERPL-MCNC: 69 MG/DL (ref 70–110)
HBA1C MFR BLD: 5.2 % (ref 4–5.6)
HCT VFR BLD AUTO: 41 % (ref 37–48.5)
HGB BLD-MCNC: 13.1 G/DL (ref 12–16)
IMM GRANULOCYTES # BLD AUTO: 0.03 K/UL (ref 0–0.04)
IMM GRANULOCYTES NFR BLD AUTO: 0.3 % (ref 0–0.5)
LYMPHOCYTES # BLD AUTO: 2.8 K/UL (ref 1–4.8)
LYMPHOCYTES NFR BLD: 28.4 % (ref 18–48)
MCH RBC QN AUTO: 27.5 PG (ref 27–31)
MCHC RBC AUTO-ENTMCNC: 32 G/DL (ref 32–36)
MCV RBC AUTO: 86 FL (ref 82–98)
MONOCYTES # BLD AUTO: 0.8 K/UL (ref 0.3–1)
MONOCYTES NFR BLD: 8 % (ref 4–15)
NEUTROPHILS # BLD AUTO: 6 K/UL (ref 1.8–7.7)
NEUTROPHILS NFR BLD: 61.3 % (ref 38–73)
NRBC BLD-RTO: 0 /100 WBC
PLATELET # BLD AUTO: 379 K/UL (ref 150–450)
PMV BLD AUTO: 10.3 FL (ref 9.2–12.9)
POTASSIUM SERPL-SCNC: 4.8 MMOL/L (ref 3.5–5.1)
PROT SERPL-MCNC: 7.1 G/DL (ref 6–8.4)
RBC # BLD AUTO: 4.76 M/UL (ref 4–5.4)
SODIUM SERPL-SCNC: 141 MMOL/L (ref 136–145)
TSH SERPL DL<=0.005 MIU/L-ACNC: 2.37 UIU/ML (ref 0.4–4)
WBC # BLD AUTO: 9.76 K/UL (ref 3.9–12.7)

## 2023-03-17 PROCEDURE — 83036 HEMOGLOBIN GLYCOSYLATED A1C: CPT | Performed by: PHYSICIAN ASSISTANT

## 2023-03-17 PROCEDURE — 3074F SYST BP LT 130 MM HG: CPT | Mod: CPTII,S$GLB,, | Performed by: PHYSICIAN ASSISTANT

## 2023-03-17 PROCEDURE — 80053 COMPREHEN METABOLIC PANEL: CPT | Performed by: PHYSICIAN ASSISTANT

## 2023-03-17 PROCEDURE — 3008F PR BODY MASS INDEX (BMI) DOCUMENTED: ICD-10-PCS | Mod: CPTII,S$GLB,, | Performed by: PHYSICIAN ASSISTANT

## 2023-03-17 PROCEDURE — 99999 PR PBB SHADOW E&M-EST. PATIENT-LVL IV: CPT | Mod: PBBFAC,,, | Performed by: PHYSICIAN ASSISTANT

## 2023-03-17 PROCEDURE — 1160F PR REVIEW ALL MEDS BY PRESCRIBER/CLIN PHARMACIST DOCUMENTED: ICD-10-PCS | Mod: CPTII,S$GLB,, | Performed by: PHYSICIAN ASSISTANT

## 2023-03-17 PROCEDURE — 99214 OFFICE O/P EST MOD 30 MIN: CPT | Mod: S$GLB,,, | Performed by: PHYSICIAN ASSISTANT

## 2023-03-17 PROCEDURE — 1160F RVW MEDS BY RX/DR IN RCRD: CPT | Mod: CPTII,S$GLB,, | Performed by: PHYSICIAN ASSISTANT

## 2023-03-17 PROCEDURE — 3074F PR MOST RECENT SYSTOLIC BLOOD PRESSURE < 130 MM HG: ICD-10-PCS | Mod: CPTII,S$GLB,, | Performed by: PHYSICIAN ASSISTANT

## 2023-03-17 PROCEDURE — 99214 PR OFFICE/OUTPT VISIT, EST, LEVL IV, 30-39 MIN: ICD-10-PCS | Mod: S$GLB,,, | Performed by: PHYSICIAN ASSISTANT

## 2023-03-17 PROCEDURE — 3078F PR MOST RECENT DIASTOLIC BLOOD PRESSURE < 80 MM HG: ICD-10-PCS | Mod: CPTII,S$GLB,, | Performed by: PHYSICIAN ASSISTANT

## 2023-03-17 PROCEDURE — 1159F PR MEDICATION LIST DOCUMENTED IN MEDICAL RECORD: ICD-10-PCS | Mod: CPTII,S$GLB,, | Performed by: PHYSICIAN ASSISTANT

## 2023-03-17 PROCEDURE — 85025 COMPLETE CBC W/AUTO DIFF WBC: CPT | Performed by: PHYSICIAN ASSISTANT

## 2023-03-17 PROCEDURE — 36415 COLL VENOUS BLD VENIPUNCTURE: CPT | Mod: PO | Performed by: PHYSICIAN ASSISTANT

## 2023-03-17 PROCEDURE — 84443 ASSAY THYROID STIM HORMONE: CPT | Performed by: PHYSICIAN ASSISTANT

## 2023-03-17 PROCEDURE — 3008F BODY MASS INDEX DOCD: CPT | Mod: CPTII,S$GLB,, | Performed by: PHYSICIAN ASSISTANT

## 2023-03-17 PROCEDURE — 99999 PR PBB SHADOW E&M-EST. PATIENT-LVL IV: ICD-10-PCS | Mod: PBBFAC,,, | Performed by: PHYSICIAN ASSISTANT

## 2023-03-17 PROCEDURE — 3078F DIAST BP <80 MM HG: CPT | Mod: CPTII,S$GLB,, | Performed by: PHYSICIAN ASSISTANT

## 2023-03-17 PROCEDURE — 1159F MED LIST DOCD IN RCRD: CPT | Mod: CPTII,S$GLB,, | Performed by: PHYSICIAN ASSISTANT

## 2023-03-17 RX ORDER — NIFEDIPINE 30 MG/1
30 TABLET, EXTENDED RELEASE ORAL
COMMUNITY
Start: 2023-03-06 | End: 2023-03-30

## 2023-03-17 NOTE — PROGRESS NOTES
Assessment/Plan:    Problem List Items Addressed This Visit          Obstetric    History of pre-eclampsia    Overview     Hypertension Medications               NIFEdipine (PROCARDIA-XL) 30 MG (OSM) 24 hr tablet Take 30 mg by mouth.   -hx of preeclampsia in third trimester, delivered on 2/7 and was started on Procardia  -BP at goal today and has been at goal on recent home checks, slightly elevated HR on exam  -plan to wean off Procardia  -monitor BP at home, BP check in 2 weeks   -continue lifestyle modification with low sodium diet and exercise   -discussed hypertension disease course and importance of treating high blood pressure  -patient understood and advised of risk of untreated blood pressure. ER precautions were given for symptoms of hypertensive urgency and emergency.         Relevant Orders    CBC Auto Differential    Comprehensive Metabolic Panel     Other Visit Diagnoses       Encounter for annual health examination    -  Primary    Relevant Orders    CBC Auto Differential    Comprehensive Metabolic Panel    Tachycardia        Relevant Orders    TSH    History of gestational diabetes        Relevant Orders    Hemoglobin A1C    Encounter for lipid screening for cardiovascular disease        Relevant Orders    Lipid Panel            Follow up in about 2 weeks (around 3/31/2023) for BP check with PCP.    Delores Porter PA-C  ______________________________________________________________________________________________________________________________    CC: establish care    HPI: Patient is a new patient to me here to establish care.     Previous PCP: Dr. Monique Boyer    Preeclampsia: Patient has a history of preeclampsia. She reports having her baby on 2/7 in which she was started on BP medication (Procardia) shortly after that time. BP is at goal today and has been at goal on recent home checks. She denies prior history of hypertension. She denies headache, vision changes, chest pain, palpitations,  shortness of breath, or lower extremity edema.    Patient also reports history of gestational diabetes and is requesting to have her A1c checked. Will also check additional labs today.     No other complaints.     Past Medical History:   Diagnosis Date    Delivery of pregnancy by  section     Digestive disorder     diarrhea    Gestational diabetes     Pre-eclampsia in third trimester 3/17/2023    Unspecified pre-eclampsia, unspecified trimester      Past Surgical History:   Procedure Laterality Date     SECTION  23    COLONOSCOPY N/A 2021    Procedure: COLONOSCOPY;  Surgeon: Gayle Mcneill MD;  Location: [facility];  Service: Endoscopy;  Laterality: N/A;    ESOPHAGOGASTRODUODENOSCOPY N/A 2021    Procedure: EGD (ESOPHAGOGASTRODUODENOSCOPY);  Surgeon: Gayle Mcneill MD;  Location: [facility];  Service: Endoscopy;  Laterality: N/A;    WISDOM TOOTH EXTRACTION       Review of patient's allergies indicates:   Allergen Reactions    Corticosteroids (glucocorticoids) Palpitations     Oral steroid     Social History     Tobacco Use    Smoking status: Never    Smokeless tobacco: Never   Substance Use Topics    Alcohol use: Not Currently    Drug use: Never     Family History   Problem Relation Age of Onset    Alcohol abuse Mother     Depression Mother     Drug abuse Mother     Diabetes Father         Prediabetic    Hearing loss Father     Cancer Paternal Grandfather         Colorectal cancer    Hearing loss Paternal Grandfather     Stroke Paternal Grandfather     Diabetes Maternal Aunt     Miscarriages / Stillbirths Maternal Aunt         Cannot have children    Miscarriages / Stillbirths Sister      Current Outpatient Medications on File Prior to Visit   Medication Sig Dispense Refill    Lactobacillus rhamnosus GG (CULTURELLE) 10 billion cell capsule Take 1 capsule by mouth once daily.      NIFEdipine (PROCARDIA-XL) 30 MG (OSM) 24 hr tablet Take 30 mg by mouth.       "omega-3/dha/epa/dpa/fish oil (OMEGA-3 2100 ORAL)       prenatal vit/iron fum/folic ac (PRENATAL 1+1 ORAL)       multivitamin capsule Take 1 capsule by mouth once daily.       No current facility-administered medications on file prior to visit.       Review of Systems   Constitutional:  Negative for chills, diaphoresis, fatigue and fever.   HENT:  Negative for congestion, ear pain, postnasal drip, sinus pain and sore throat.    Eyes:  Negative for pain and redness.   Respiratory:  Negative for cough, chest tightness and shortness of breath.    Cardiovascular:  Negative for chest pain and leg swelling.   Gastrointestinal:  Negative for abdominal pain, constipation, diarrhea, nausea and vomiting.   Genitourinary:  Negative for dysuria and hematuria.   Musculoskeletal:  Negative for arthralgias and joint swelling.   Skin:  Negative for rash.   Neurological:  Negative for dizziness, syncope and headaches.   Psychiatric/Behavioral:  Negative for dysphoric mood. The patient is not nervous/anxious.      Vitals:    03/17/23 0945   BP: 118/76   Pulse: 106   Temp: 98.2 °F (36.8 °C)   TempSrc: Oral   Weight: 53.1 kg (117 lb)   Height: 5' 2" (1.575 m)       Wt Readings from Last 3 Encounters:   03/17/23 53.1 kg (117 lb)   05/26/22 56.2 kg (123 lb 14.4 oz)   05/12/22 56.2 kg (124 lb)       Physical Exam  Constitutional:       General: She is not in acute distress.     Appearance: Normal appearance. She is well-developed.   HENT:      Head: Normocephalic and atraumatic.   Eyes:      Conjunctiva/sclera: Conjunctivae normal.   Cardiovascular:      Rate and Rhythm: Normal rate and regular rhythm.      Pulses: Normal pulses.      Heart sounds: Normal heart sounds. No murmur heard.  Pulmonary:      Effort: Pulmonary effort is normal. No respiratory distress.      Breath sounds: Normal breath sounds.   Abdominal:      General: Bowel sounds are normal. There is no distension.      Palpations: Abdomen is soft.      Tenderness: There is no " abdominal tenderness.   Musculoskeletal:         General: Normal range of motion.      Cervical back: Normal range of motion and neck supple.   Skin:     General: Skin is warm and dry.      Findings: No rash.   Neurological:      General: No focal deficit present.      Mental Status: She is alert and oriented to person, place, and time.   Psychiatric:         Mood and Affect: Mood normal.         Behavior: Behavior normal.       Health Maintenance   Topic Date Due    TETANUS VACCINE  08/07/2027    Hepatitis C Screening  Completed    Lipid Panel  Completed

## 2023-03-20 NOTE — PROGRESS NOTES
Results have been released via [employer]. Please verify that these have been viewed by patient. If not, please call patient with results.     I have sent a message to them with the following interpretation (see below).    I have reviewed your recent blood work.     CBC NORMAL-The CBC appears to be stable at this time with no sign of major anemia, abnormal white count or platelet abnormality.  CMP/BMP NORMAL-The electrolytes all appear stable at this time. This includes kidney functions along with routine electrolytes like sugar, potassium and sodium. The liver enzymes were noted to be stable also.  A1C NORMAL-The A1c is normal.   TSH NORMAL-The TSH screening indicated a normal function of the thyroid.    Please do not hesitate to call or message with any additional questions or concerns.    Delores Porter PA-C

## 2023-03-30 ENCOUNTER — PATIENT MESSAGE (OUTPATIENT)
Dept: FAMILY MEDICINE | Facility: CLINIC | Age: 33
End: 2023-03-30

## 2023-03-30 ENCOUNTER — OFFICE VISIT (OUTPATIENT)
Dept: FAMILY MEDICINE | Facility: CLINIC | Age: 33
End: 2023-03-30
Payer: COMMERCIAL

## 2023-03-30 VITALS
HEART RATE: 100 BPM | DIASTOLIC BLOOD PRESSURE: 78 MMHG | OXYGEN SATURATION: 99 % | SYSTOLIC BLOOD PRESSURE: 116 MMHG | WEIGHT: 117 LBS | HEIGHT: 62 IN | BODY MASS INDEX: 21.53 KG/M2

## 2023-03-30 DIAGNOSIS — E78.00 PURE HYPERCHOLESTEROLEMIA: ICD-10-CM

## 2023-03-30 DIAGNOSIS — Z87.59 HISTORY OF PRE-ECLAMPSIA: Primary | ICD-10-CM

## 2023-03-30 PROBLEM — E78.5 HYPERLIPIDEMIA: Status: ACTIVE | Noted: 2023-03-30

## 2023-03-30 PROCEDURE — 99999 PR PBB SHADOW E&M-EST. PATIENT-LVL IV: ICD-10-PCS | Mod: PBBFAC,,, | Performed by: NURSE PRACTITIONER

## 2023-03-30 PROCEDURE — 1159F PR MEDICATION LIST DOCUMENTED IN MEDICAL RECORD: ICD-10-PCS | Mod: CPTII,S$GLB,, | Performed by: NURSE PRACTITIONER

## 2023-03-30 PROCEDURE — 3074F PR MOST RECENT SYSTOLIC BLOOD PRESSURE < 130 MM HG: ICD-10-PCS | Mod: CPTII,S$GLB,, | Performed by: NURSE PRACTITIONER

## 2023-03-30 PROCEDURE — 3008F BODY MASS INDEX DOCD: CPT | Mod: CPTII,S$GLB,, | Performed by: NURSE PRACTITIONER

## 2023-03-30 PROCEDURE — 3044F HG A1C LEVEL LT 7.0%: CPT | Mod: CPTII,S$GLB,, | Performed by: NURSE PRACTITIONER

## 2023-03-30 PROCEDURE — 3008F PR BODY MASS INDEX (BMI) DOCUMENTED: ICD-10-PCS | Mod: CPTII,S$GLB,, | Performed by: NURSE PRACTITIONER

## 2023-03-30 PROCEDURE — 3078F DIAST BP <80 MM HG: CPT | Mod: CPTII,S$GLB,, | Performed by: NURSE PRACTITIONER

## 2023-03-30 PROCEDURE — 99999 PR PBB SHADOW E&M-EST. PATIENT-LVL IV: CPT | Mod: PBBFAC,,, | Performed by: NURSE PRACTITIONER

## 2023-03-30 PROCEDURE — 99213 OFFICE O/P EST LOW 20 MIN: CPT | Mod: S$GLB,,, | Performed by: NURSE PRACTITIONER

## 2023-03-30 PROCEDURE — 1160F RVW MEDS BY RX/DR IN RCRD: CPT | Mod: CPTII,S$GLB,, | Performed by: NURSE PRACTITIONER

## 2023-03-30 PROCEDURE — 3078F PR MOST RECENT DIASTOLIC BLOOD PRESSURE < 80 MM HG: ICD-10-PCS | Mod: CPTII,S$GLB,, | Performed by: NURSE PRACTITIONER

## 2023-03-30 PROCEDURE — 1159F MED LIST DOCD IN RCRD: CPT | Mod: CPTII,S$GLB,, | Performed by: NURSE PRACTITIONER

## 2023-03-30 PROCEDURE — 1160F PR REVIEW ALL MEDS BY PRESCRIBER/CLIN PHARMACIST DOCUMENTED: ICD-10-PCS | Mod: CPTII,S$GLB,, | Performed by: NURSE PRACTITIONER

## 2023-03-30 PROCEDURE — 3074F SYST BP LT 130 MM HG: CPT | Mod: CPTII,S$GLB,, | Performed by: NURSE PRACTITIONER

## 2023-03-30 PROCEDURE — 99213 PR OFFICE/OUTPT VISIT, EST, LEVL III, 20-29 MIN: ICD-10-PCS | Mod: S$GLB,,, | Performed by: NURSE PRACTITIONER

## 2023-03-30 PROCEDURE — 3044F PR MOST RECENT HEMOGLOBIN A1C LEVEL <7.0%: ICD-10-PCS | Mod: CPTII,S$GLB,, | Performed by: NURSE PRACTITIONER

## 2023-03-30 RX ORDER — FLASH GLUCOSE SENSOR
KIT MISCELLANEOUS
COMMUNITY
Start: 2023-03-08 | End: 2023-05-15

## 2023-03-30 NOTE — PROGRESS NOTES
Assessment/Plan:  Problem List Items Addressed This Visit          Cardiac/Vascular    Pure hypercholesterolemia    Overview     -chronic condition. Currently stable. Total cholesterol slightly elevated. However, HDL is elevated. She is taking Omega-3 fish oil supplement. Low cardiac risk.   -reports compliance with hyperlipidemia treatment as prescribed  -denies any known adverse effects of medications  -recent labs listed below:  Lab Results   Component Value Date    CHOL 229 (H) 05/12/2022     Lab Results   Component Value Date    HDL 79 (H) 05/12/2022     Lab Results   Component Value Date    LDLCALC 138.4 05/12/2022     Lab Results   Component Value Date    TRIG 58 05/12/2022     Lab Results   Component Value Date    ALT 46 (H) 03/17/2023    AST 39 03/17/2023    ALKPHOS 82 03/17/2023    BILITOT 0.4 03/17/2023          Current Assessment & Plan     Patient is currently breastfeeding; await rechecking cholesterol due to possible false high. Continue low-fat diet and exercise as tolerated.             Obstetric    History of pre-eclampsia - Primary    Overview     Hypertension Medications               NIFEdipine (PROCARDIA-XL) 30 MG (OSM) 24 hr tablet Take 30 mg by mouth.   -hx of preeclampsia in third trimester, delivered on 2/7 and was started on Procardia  -BP at goal today and has been at goal on recent home checks, slightly elevated HR on exam  -plan to wean off Procardia  -monitor BP at home, BP check in 2 weeks   -continue lifestyle modification with low sodium diet and exercise   -discussed hypertension disease course and importance of treating high blood pressure  -patient understood and advised of risk of untreated blood pressure. ER precautions were given for symptoms of hypertensive urgency and emergency.         Current Assessment & Plan     Off procardia. Doing well. BP well controlled. No acute complaints.    -at goal today (116/78)  - Ambulatory monitoring of BP- notify PCP of fluctuations in  readings or if BP remains greater than 140/80.   -continue lifestyle modification with low sodium diet and exercise   -discussed hypertension disease course and importance of treating high blood pressure  -patient understood and advised of risk of untreated blood pressure.  ER precautions were given for symptoms of hypertensive urgency and emergency.  -The patient denies any chest pain, shortness of breath, palpitations, dizziness, lightheadedness, headache, arm numbness tingling or weakness, syncope.          Follow up if symptoms worsen or fail to improve, for follow up with PCP.  ER precautions for severe or worsening symptoms.     Dodie Blair NP  _____________________________________________________________________________________________________________________________________________________    CC: blood pressure     HPI: Patient is a 32-year-old female who presents in clinic today as an established patient here for blood pressure follow up. She is 7 weeks postpartum. Treated for pre-eclampsia while pregnant and was started on Procardia. She has been weaned off. She has been monitoring blood pressure at home and reports BP less than 140/80. Counseling was offered regarding low sodium diet. Routine exercise recommended. The patient denies headache, vision changes, chest pain, palpitations, shortness of breath, or lower extremity edema. Chronic condition has been reviewed and remains stable. Further details as stated above.     Reviewed recent labs below which are stable.  Lab Results   Component Value Date    WBC 9.76 03/17/2023    HGB 13.1 03/17/2023    HCT 41.0 03/17/2023    MCV 86 03/17/2023     03/17/2023     Lab Results   Component Value Date     03/17/2023    K 4.8 03/17/2023     03/17/2023    CO2 24 03/17/2023    BUN 16 03/17/2023    CREATININE 0.8 03/17/2023    CALCIUM 9.9 03/17/2023    ANIONGAP 11 03/17/2023    EGFRNORACEVR >60.0 03/17/2023     Lab Results   Component Value Date     ALT 46 (H) 2023    AST 39 2023    ALKPHOS 82 2023    BILITOT 0.4 2023     Lab Results   Component Value Date    HGBA1C 5.2 2023     Lab Results   Component Value Date    TSH 2.365 2023     Past Medical History:  Past Medical History:   Diagnosis Date    Delivery of pregnancy by  section     Digestive disorder     diarrhea    Gestational diabetes     Pre-eclampsia in third trimester 3/17/2023    Unspecified pre-eclampsia, unspecified trimester      Past Surgical History:   Procedure Laterality Date     SECTION  23    COLONOSCOPY N/A 2021    Procedure: COLONOSCOPY;  Surgeon: Gayle Mcneill MD;  Location: [facility] ENDO;  Service: Endoscopy;  Laterality: N/A;    ESOPHAGOGASTRODUODENOSCOPY N/A 2021    Procedure: EGD (ESOPHAGOGASTRODUODENOSCOPY);  Surgeon: Gayle Mcneill MD;  Location: [facility] ENDO;  Service: Endoscopy;  Laterality: N/A;    WISDOM TOOTH EXTRACTION       Review of patient's allergies indicates:   Allergen Reactions    Corticosteroids (glucocorticoids) Palpitations     Oral steroid     Social History     Tobacco Use    Smoking status: Never    Smokeless tobacco: Never   Substance Use Topics    Alcohol use: Not Currently    Drug use: Never     Family History   Problem Relation Age of Onset    Alcohol abuse Mother     Depression Mother     Drug abuse Mother     Diabetes Father         Prediabetic    Hearing loss Father     Cancer Paternal Grandfather         Colorectal cancer    Hearing loss Paternal Grandfather     Stroke Paternal Grandfather     Diabetes Maternal Aunt     Miscarriages / Stillbirths Maternal Aunt         Cannot have children    Miscarriages / Stillbirths Sister      Current Outpatient Medications on File Prior to Visit   Medication Sig Dispense Refill    Lactobacillus rhamnosus GG (CULTURELLE) 10 billion cell capsule Take 1 capsule by mouth once daily.      omega-3/dha/epa/dpa/fish oil (OMEGA-3 2100 ORAL)        "prenatal vit/iron fum/folic ac (PRENATAL 1+1 ORAL)       FREESTYLE RADHA 2 SENSOR Kit as directed check blood sugar fasting and 2 hours after breakfast, lunch and dinner 14 days      [DISCONTINUED] NIFEdipine (PROCARDIA-XL) 30 MG (OSM) 24 hr tablet Take 30 mg by mouth.       No current facility-administered medications on file prior to visit.     Review of Systems   Constitutional:  Negative for appetite change, chills, fatigue and fever.   HENT:  Negative for congestion, rhinorrhea and sore throat.    Eyes:  Negative for visual disturbance.   Respiratory:  Negative for cough and shortness of breath.    Cardiovascular:  Negative for chest pain, palpitations and leg swelling.   Gastrointestinal:  Negative for abdominal pain, diarrhea and vomiting.   Genitourinary:  Negative for difficulty urinating, dysuria and hematuria.   Musculoskeletal:  Negative for arthralgias and myalgias.   Skin:  Negative for rash and wound.   Neurological:  Negative for dizziness and headaches.   Psychiatric/Behavioral:  Negative for behavioral problems. The patient is not nervous/anxious.      Vitals:    03/30/23 0846   BP: 116/78   BP Location: Right arm   Pulse: 100   SpO2: 99%   Weight: 53.1 kg (117 lb)   Height: 5' 2" (1.575 m)     Wt Readings from Last 3 Encounters:   03/30/23 53.1 kg (117 lb)   03/17/23 53.1 kg (117 lb)   05/26/22 56.2 kg (123 lb 14.4 oz)     Physical Exam  Vitals reviewed.   Constitutional:       General: She is not in acute distress.     Appearance: Normal appearance. She is not ill-appearing.   HENT:      Head: Normocephalic and atraumatic.      Right Ear: External ear normal.      Left Ear: External ear normal.      Nose: Nose normal.   Eyes:      Extraocular Movements: Extraocular movements intact.      Conjunctiva/sclera: Conjunctivae normal.   Cardiovascular:      Rate and Rhythm: Normal rate.      Heart sounds: Normal heart sounds.   Pulmonary:      Effort: Pulmonary effort is normal. No respiratory distress. "      Breath sounds: Normal breath sounds.   Musculoskeletal:         General: Normal range of motion.      Cervical back: Normal range of motion.   Skin:     General: Skin is warm and dry.      Capillary Refill: Capillary refill takes less than 2 seconds.      Coloration: Skin is not pale.      Findings: No rash.   Neurological:      General: No focal deficit present.      Mental Status: She is alert and oriented to person, place, and time. Mental status is at baseline.   Psychiatric:         Mood and Affect: Mood normal.         Speech: Speech normal. Speech is not rapid and pressured, delayed or slurred.         Behavior: Behavior normal. Behavior is not agitated, slowed, aggressive, withdrawn, hyperactive or combative. Behavior is cooperative.         Thought Content: Thought content normal.         Judgment: Judgment normal.     Health Maintenance   Topic Date Due    TETANUS VACCINE  01/03/2033    Hepatitis C Screening  Completed    Lipid Panel  Completed

## 2023-03-30 NOTE — ASSESSMENT & PLAN NOTE
Patient is currently breastfeeding; await rechecking cholesterol due to possible false high. Continue low-fat diet and exercise as tolerated.

## 2023-03-30 NOTE — ASSESSMENT & PLAN NOTE
Off procardia. Doing well. BP well controlled. No acute complaints.    -at goal today (116/78)  - Ambulatory monitoring of BP- notify PCP of fluctuations in readings or if BP remains greater than 140/80.   -continue lifestyle modification with low sodium diet and exercise   -discussed hypertension disease course and importance of treating high blood pressure  -patient understood and advised of risk of untreated blood pressure.  ER precautions were given for symptoms of hypertensive urgency and emergency.  -The patient denies any chest pain, shortness of breath, palpitations, dizziness, lightheadedness, headache, arm numbness tingling or weakness, syncope.

## 2023-05-15 ENCOUNTER — OFFICE VISIT (OUTPATIENT)
Dept: FAMILY MEDICINE | Facility: CLINIC | Age: 33
End: 2023-05-15
Payer: COMMERCIAL

## 2023-05-15 VITALS
SYSTOLIC BLOOD PRESSURE: 117 MMHG | HEART RATE: 77 BPM | TEMPERATURE: 98 F | DIASTOLIC BLOOD PRESSURE: 78 MMHG | WEIGHT: 119 LBS | BODY MASS INDEX: 21.9 KG/M2 | HEIGHT: 62 IN

## 2023-05-15 DIAGNOSIS — Z00.00 ENCOUNTER FOR ANNUAL HEALTH EXAMINATION: Primary | ICD-10-CM

## 2023-05-15 PROCEDURE — 99395 PR PREVENTIVE VISIT,EST,18-39: ICD-10-PCS | Mod: S$GLB,,, | Performed by: INTERNAL MEDICINE

## 2023-05-15 PROCEDURE — 3074F SYST BP LT 130 MM HG: CPT | Mod: CPTII,S$GLB,, | Performed by: INTERNAL MEDICINE

## 2023-05-15 PROCEDURE — 3074F PR MOST RECENT SYSTOLIC BLOOD PRESSURE < 130 MM HG: ICD-10-PCS | Mod: CPTII,S$GLB,, | Performed by: INTERNAL MEDICINE

## 2023-05-15 PROCEDURE — 3078F DIAST BP <80 MM HG: CPT | Mod: CPTII,S$GLB,, | Performed by: INTERNAL MEDICINE

## 2023-05-15 PROCEDURE — 1159F MED LIST DOCD IN RCRD: CPT | Mod: CPTII,S$GLB,, | Performed by: INTERNAL MEDICINE

## 2023-05-15 PROCEDURE — 3008F BODY MASS INDEX DOCD: CPT | Mod: CPTII,S$GLB,, | Performed by: INTERNAL MEDICINE

## 2023-05-15 PROCEDURE — 3008F PR BODY MASS INDEX (BMI) DOCUMENTED: ICD-10-PCS | Mod: CPTII,S$GLB,, | Performed by: INTERNAL MEDICINE

## 2023-05-15 PROCEDURE — 99999 PR PBB SHADOW E&M-EST. PATIENT-LVL IV: CPT | Mod: PBBFAC,,, | Performed by: INTERNAL MEDICINE

## 2023-05-15 PROCEDURE — 3044F HG A1C LEVEL LT 7.0%: CPT | Mod: CPTII,S$GLB,, | Performed by: INTERNAL MEDICINE

## 2023-05-15 PROCEDURE — 3078F PR MOST RECENT DIASTOLIC BLOOD PRESSURE < 80 MM HG: ICD-10-PCS | Mod: CPTII,S$GLB,, | Performed by: INTERNAL MEDICINE

## 2023-05-15 PROCEDURE — 99395 PREV VISIT EST AGE 18-39: CPT | Mod: S$GLB,,, | Performed by: INTERNAL MEDICINE

## 2023-05-15 PROCEDURE — 99999 PR PBB SHADOW E&M-EST. PATIENT-LVL IV: ICD-10-PCS | Mod: PBBFAC,,, | Performed by: INTERNAL MEDICINE

## 2023-05-15 PROCEDURE — 3044F PR MOST RECENT HEMOGLOBIN A1C LEVEL <7.0%: ICD-10-PCS | Mod: CPTII,S$GLB,, | Performed by: INTERNAL MEDICINE

## 2023-05-15 PROCEDURE — 1159F PR MEDICATION LIST DOCUMENTED IN MEDICAL RECORD: ICD-10-PCS | Mod: CPTII,S$GLB,, | Performed by: INTERNAL MEDICINE

## 2023-05-15 RX ORDER — FLUOXETINE HYDROCHLORIDE 20 MG/1
CAPSULE ORAL
COMMUNITY
Start: 2023-05-04 | End: 2024-04-01 | Stop reason: SDUPTHER

## 2023-05-15 NOTE — PROGRESS NOTES
This note is specifically for wellness visit performed today.       Assessment / Plan:      Patient here for annual wellness exam. Health maintenance was reviewed and ordered.    Complete history and physical was completed today.  Complete and thorough medication reconciliation was performed.  Discussed risks and benefits of medications.  Advised patient on orders and health maintenance. Continue current medications listed on your summary sheet.    All questions were answered. Patient had no further concerns. Advised of diagnoses and plan.     Problem List Items Addressed This Visit          Psychiatric    Post partum depression    Overview     -recently started on prozac 20 mg with good response  -denies AE  -following with a therapist as well            Other Visit Diagnoses       Encounter for annual health examination    -  Primary            Maureen Burr MD       WELLNESS EXAM      Patient ID: Violette Esposito is a 32 y.o. female.  has a past medical history of Anxiety, Delivery of pregnancy by  section, Depression, Digestive disorder, Gestational diabetes, Pre-eclampsia in third trimester (2023), and Unspecified pre-eclampsia, unspecified trimester.     Chief Complaint:  Encounter for wellness exam    Well Adult Physical: Patient here for a comprehensive physical exam.     Health Maintenance Topics with due status: Not Due       Topic Last Completion Date    Cervical Cancer Screening 2021    TETANUS VACCINE 2023        ==============================================    There are no preventive care reminders to display for this patient.    Past Medical History:  Past Medical History:   Diagnosis Date    Anxiety     Delivery of pregnancy by  section     Depression     Digestive disorder     diarrhea    Gestational diabetes     Pre-eclampsia in third trimester 2023    Unspecified pre-eclampsia, unspecified trimester      Past Surgical History:   Procedure  Laterality Date     SECTION  23    COLONOSCOPY N/A 2021    Procedure: COLONOSCOPY;  Surgeon: Gayle Mcneill MD;  Location: [facility];  Service: Endoscopy;  Laterality: N/A;    ESOPHAGOGASTRODUODENOSCOPY N/A 2021    Procedure: EGD (ESOPHAGOGASTRODUODENOSCOPY);  Surgeon: Gayle Mcneill MD;  Location: [facility] ENDO;  Service: Endoscopy;  Laterality: N/A;    WISDOM TOOTH EXTRACTION       Review of patient's allergies indicates:   Allergen Reactions    Corticosteroids (glucocorticoids) Palpitations     Oral steroid     Current Outpatient Medications on File Prior to Visit   Medication Sig Dispense Refill    FLUoxetine 20 MG capsule       Lactobacillus rhamnosus GG (CULTURELLE) 10 billion cell capsule Take 1 capsule by mouth once daily.      omega-3/dha/epa/dpa/fish oil (OMEGA-3 2100 ORAL)       prenatal vit/iron fum/folic ac (PRENATAL 1+1 ORAL)       [DISCONTINUED] FREESTYLE RADHA 2 SENSOR Kit as directed check blood sugar fasting and 2 hours after breakfast, lunch and dinner 14 days       No current facility-administered medications on file prior to visit.     Social History     Socioeconomic History    Marital status:    Tobacco Use    Smoking status: Never    Smokeless tobacco: Never   Substance and Sexual Activity    Alcohol use: Not Currently    Drug use: Never    Sexual activity: Yes     Partners: Female     Birth control/protection: Condom, Other-see comments     Comment: Breastfeeding, cycle tracking     Family History   Problem Relation Age of Onset    Alcohol abuse Mother     Depression Mother     Drug abuse Mother     Diabetes Father         Prediabetic    Hearing loss Father     Hyperlipidemia Father     Miscarriages / Stillbirths Sister     Cancer Paternal Grandfather         Colorectal cancer    Hearing loss Paternal Grandfather     Stroke Paternal Grandfather     Diabetes Maternal Aunt     Miscarriages / Stillbirths Maternal Aunt         Cannot have children        Review of Systems   Constitutional:  Negative for chills, fever and malaise/fatigue.   HENT:  Negative for congestion and sore throat.    Eyes:  Negative for blurred vision and double vision.   Respiratory:  Negative for cough and shortness of breath.    Cardiovascular:  Negative for chest pain, palpitations and leg swelling.   Gastrointestinal:  Negative for abdominal pain, constipation and diarrhea.   Genitourinary:  Negative for dysuria and frequency.   Musculoskeletal:  Negative for back pain and joint pain.   Neurological:  Negative for headaches.   Psychiatric/Behavioral:  Negative for depression. The patient is not nervous/anxious.          Objective:      Vitals:    05/15/23 1056   BP: 117/78   Pulse: 77   Temp: 97.9 °F (36.6 °C)     Body mass index is 21.77 kg/m².     Physical Exam  Constitutional:       General: She is not in acute distress.     Appearance: Normal appearance. She is well-developed.   HENT:      Head: Normocephalic and atraumatic.   Eyes:      Conjunctiva/sclera: Conjunctivae normal.   Cardiovascular:      Rate and Rhythm: Normal rate and regular rhythm.      Pulses: Normal pulses.      Heart sounds: Normal heart sounds. No murmur heard.  Pulmonary:      Effort: Pulmonary effort is normal. No respiratory distress.      Breath sounds: Normal breath sounds.   Abdominal:      General: Bowel sounds are normal. There is no distension.      Palpations: Abdomen is soft.      Tenderness: There is no abdominal tenderness.   Musculoskeletal:         General: Normal range of motion.      Cervical back: Normal range of motion and neck supple.   Skin:     General: Skin is warm and dry.      Findings: No rash.   Neurological:      General: No focal deficit present.      Mental Status: She is alert and oriented to person, place, and time.   Psychiatric:         Mood and Affect: Mood normal.         Behavior: Behavior normal.           All labs, imaging and procedures performed since last visit have  been personally reviewed.

## 2023-10-05 ENCOUNTER — PATIENT MESSAGE (OUTPATIENT)
Dept: FAMILY MEDICINE | Facility: CLINIC | Age: 33
End: 2023-10-05

## 2023-10-05 ENCOUNTER — OFFICE VISIT (OUTPATIENT)
Dept: FAMILY MEDICINE | Facility: CLINIC | Age: 33
End: 2023-10-05
Payer: COMMERCIAL

## 2023-10-05 DIAGNOSIS — B96.89 ACUTE BACTERIAL SINUSITIS: Primary | ICD-10-CM

## 2023-10-05 DIAGNOSIS — J01.90 ACUTE BACTERIAL SINUSITIS: Primary | ICD-10-CM

## 2023-10-05 PROCEDURE — 3044F HG A1C LEVEL LT 7.0%: CPT | Mod: CPTII,95,, | Performed by: NURSE PRACTITIONER

## 2023-10-05 PROCEDURE — 99213 OFFICE O/P EST LOW 20 MIN: CPT | Mod: 95,,, | Performed by: NURSE PRACTITIONER

## 2023-10-05 PROCEDURE — 99213 PR OFFICE/OUTPT VISIT, EST, LEVL III, 20-29 MIN: ICD-10-PCS | Mod: 95,,, | Performed by: NURSE PRACTITIONER

## 2023-10-05 PROCEDURE — 1159F MED LIST DOCD IN RCRD: CPT | Mod: CPTII,95,, | Performed by: NURSE PRACTITIONER

## 2023-10-05 PROCEDURE — 1160F PR REVIEW ALL MEDS BY PRESCRIBER/CLIN PHARMACIST DOCUMENTED: ICD-10-PCS | Mod: CPTII,95,, | Performed by: NURSE PRACTITIONER

## 2023-10-05 PROCEDURE — 3044F PR MOST RECENT HEMOGLOBIN A1C LEVEL <7.0%: ICD-10-PCS | Mod: CPTII,95,, | Performed by: NURSE PRACTITIONER

## 2023-10-05 PROCEDURE — 1159F PR MEDICATION LIST DOCUMENTED IN MEDICAL RECORD: ICD-10-PCS | Mod: CPTII,95,, | Performed by: NURSE PRACTITIONER

## 2023-10-05 PROCEDURE — 1160F RVW MEDS BY RX/DR IN RCRD: CPT | Mod: CPTII,95,, | Performed by: NURSE PRACTITIONER

## 2023-10-05 RX ORDER — AMOXICILLIN 500 MG/1
500 TABLET, FILM COATED ORAL EVERY 12 HOURS
Qty: 20 TABLET | Refills: 0 | Status: SHIPPED | OUTPATIENT
Start: 2023-10-05 | End: 2023-10-15

## 2023-10-05 NOTE — PROGRESS NOTES
Assessment/Plan:  Problem List Items Addressed This Visit    None  Visit Diagnoses       Acute bacterial sinusitis    -  Primary    Relevant Medications    amoxicillin (AMOXIL) 500 MG Tab          Recommend zyrtec and OTC Astelin nasal spray  Start ABX as prescribed  Supportive care- rest, increase hydration with water, OTC Tylenol/Ibuprofen for pain/fever.  Follow up if symptoms worsen or fail to improve.  ER precautions for severe or worsening symptoms.     Dodie Blair NP  _____________________________________________________________________________________________________________________________________________________    CC: sinus problem     HPI: Patient is a 33-year-old female who presents today as an established patient here for sinus problem. This is a new problem. The current episode started almost a week ago. The problem is gradually worsening. There has been no fever. She is experiencing no pain. Associated symptoms include congestion, runny nose, postnasal drip, cough, runny nose, headaches, ear pressure, and sinus pressure. Pertinent negatives include no chills, diaphoresis, ear pain, hoarse voice, neck pain, shortness of breath, sneezing, sore throat or swollen glands. Past treatments include humidifier and nasal spray. The treatment provided no relief. She has had no known sick contacts.     Past Medical History:  Past Medical History:   Diagnosis Date    Anxiety     Delivery of pregnancy by  section     Depression     Digestive disorder     diarrhea    Gestational diabetes     Pre-eclampsia in third trimester 2023    Unspecified pre-eclampsia, unspecified trimester      Past Surgical History:   Procedure Laterality Date     SECTION  23    COLONOSCOPY N/A 2021    Procedure: COLONOSCOPY;  Surgeon: Gayle Mcneill MD;  Location: [facility] [place];  Service: Endoscopy;  Laterality: N/A;    ESOPHAGOGASTRODUODENOSCOPY N/A 2021    Procedure: EGD  (ESOPHAGOGASTRODUODENOSCOPY);  Surgeon: Gayle Mcneill MD;  Location: [facility] [place];  Service: Endoscopy;  Laterality: N/A;    WISDOM TOOTH EXTRACTION       Review of patient's allergies indicates:   Allergen Reactions    Corticosteroids (glucocorticoids) Palpitations     Oral steroid     Social History     Tobacco Use    Smoking status: Never    Smokeless tobacco: Never   Substance Use Topics    Alcohol use: Not Currently    Drug use: Never     Family History   Problem Relation Age of Onset    Alcohol abuse Mother     Depression Mother     Drug abuse Mother     Diabetes Father         Prediabetic    Hearing loss Father     Hyperlipidemia Father     Miscarriages / Stillbirths Sister     Cancer Paternal Grandfather         Colorectal cancer    Hearing loss Paternal Grandfather     Stroke Paternal Grandfather     Diabetes Maternal Aunt     Miscarriages / Stillbirths Maternal Aunt         Cannot have children     Current Outpatient Medications on File Prior to Visit   Medication Sig Dispense Refill    FLUoxetine 20 MG capsule       Lactobacillus rhamnosus GG (CULTURELLE) 10 billion cell capsule Take 1 capsule by mouth once daily.      omega-3/dha/epa/dpa/fish oil (OMEGA-3 2100 ORAL)       prenatal vit/iron fum/folic ac (PRENATAL 1+1 ORAL)        No current facility-administered medications on file prior to visit.     Review of Systems   Constitutional:  Negative for appetite change, chills, fatigue and fever.   HENT:  Positive for congestion, ear pain, postnasal drip, rhinorrhea and sinus pressure. Negative for sore throat.    Eyes:  Negative for visual disturbance.   Respiratory:  Positive for cough. Negative for shortness of breath and wheezing.    Cardiovascular:  Negative for chest pain, palpitations and leg swelling.   Gastrointestinal:  Negative for abdominal pain, diarrhea and vomiting.   Genitourinary:  Negative for difficulty urinating, dysuria and hematuria.   Musculoskeletal:  Negative for arthralgias and  myalgias.   Skin:  Negative for rash and wound.   Neurological:  Positive for headaches. Negative for dizziness.   Psychiatric/Behavioral:  Negative for behavioral problems. The patient is not nervous/anxious.      There were no vitals filed for this visit.    Wt Readings from Last 3 Encounters:   05/15/23 54 kg (119 lb)   03/30/23 53.1 kg (117 lb)   03/17/23 53.1 kg (117 lb)     Physical Exam  Vitals reviewed.   Constitutional:       General: She is not in acute distress.     Appearance: Normal appearance. She is normal weight. She is not ill-appearing.   HENT:      Head: Normocephalic.      Right Ear: External ear normal.      Left Ear: External ear normal.      Nose: Congestion and rhinorrhea present.      Mouth/Throat:      Mouth: Mucous membranes are moist.   Eyes:      Conjunctiva/sclera: Conjunctivae normal.   Pulmonary:      Effort: Pulmonary effort is normal. No respiratory distress.      Comments: +cough  Musculoskeletal:         General: No swelling. Normal range of motion.      Cervical back: Normal range of motion.   Skin:     Coloration: Skin is not pale.      Findings: No rash.   Neurological:      General: No focal deficit present.      Mental Status: She is alert and oriented to person, place, and time. Mental status is at baseline.   Psychiatric:         Attention and Perception: Attention normal.         Mood and Affect: Mood normal.         Speech: Speech normal. Speech is not rapid and pressured, delayed or slurred.         Behavior: Behavior normal. Behavior is not agitated, slowed, aggressive, withdrawn or hyperactive. Behavior is cooperative.         Thought Content: Thought content normal.         Judgment: Judgment normal.       Health Maintenance   Topic Date Due    TETANUS VACCINE  01/03/2033    Hepatitis C Screening  Completed    Lipid Panel  Completed

## 2023-12-21 ENCOUNTER — OFFICE VISIT (OUTPATIENT)
Dept: FAMILY MEDICINE | Facility: CLINIC | Age: 33
End: 2023-12-21
Payer: COMMERCIAL

## 2023-12-21 VITALS
HEART RATE: 96 BPM | DIASTOLIC BLOOD PRESSURE: 73 MMHG | RESPIRATION RATE: 16 BRPM | BODY MASS INDEX: 22.66 KG/M2 | SYSTOLIC BLOOD PRESSURE: 113 MMHG | HEIGHT: 62 IN | WEIGHT: 123.13 LBS | OXYGEN SATURATION: 99 % | TEMPERATURE: 98 F

## 2023-12-21 DIAGNOSIS — H66.91 ACUTE RIGHT OTITIS MEDIA: Primary | ICD-10-CM

## 2023-12-21 PROCEDURE — 3044F PR MOST RECENT HEMOGLOBIN A1C LEVEL <7.0%: ICD-10-PCS | Mod: CPTII,S$GLB,, | Performed by: PHYSICIAN ASSISTANT

## 2023-12-21 PROCEDURE — 3074F PR MOST RECENT SYSTOLIC BLOOD PRESSURE < 130 MM HG: ICD-10-PCS | Mod: CPTII,S$GLB,, | Performed by: PHYSICIAN ASSISTANT

## 2023-12-21 PROCEDURE — 3074F SYST BP LT 130 MM HG: CPT | Mod: CPTII,S$GLB,, | Performed by: PHYSICIAN ASSISTANT

## 2023-12-21 PROCEDURE — 99999 PR PBB SHADOW E&M-EST. PATIENT-LVL IV: CPT | Mod: PBBFAC,,, | Performed by: PHYSICIAN ASSISTANT

## 2023-12-21 PROCEDURE — 1159F PR MEDICATION LIST DOCUMENTED IN MEDICAL RECORD: ICD-10-PCS | Mod: CPTII,S$GLB,, | Performed by: PHYSICIAN ASSISTANT

## 2023-12-21 PROCEDURE — 99213 PR OFFICE/OUTPT VISIT, EST, LEVL III, 20-29 MIN: ICD-10-PCS | Mod: S$GLB,,, | Performed by: PHYSICIAN ASSISTANT

## 2023-12-21 PROCEDURE — 1159F MED LIST DOCD IN RCRD: CPT | Mod: CPTII,S$GLB,, | Performed by: PHYSICIAN ASSISTANT

## 2023-12-21 PROCEDURE — 3008F BODY MASS INDEX DOCD: CPT | Mod: CPTII,S$GLB,, | Performed by: PHYSICIAN ASSISTANT

## 2023-12-21 PROCEDURE — 3078F DIAST BP <80 MM HG: CPT | Mod: CPTII,S$GLB,, | Performed by: PHYSICIAN ASSISTANT

## 2023-12-21 PROCEDURE — 1160F PR REVIEW ALL MEDS BY PRESCRIBER/CLIN PHARMACIST DOCUMENTED: ICD-10-PCS | Mod: CPTII,S$GLB,, | Performed by: PHYSICIAN ASSISTANT

## 2023-12-21 PROCEDURE — 99999 PR PBB SHADOW E&M-EST. PATIENT-LVL IV: ICD-10-PCS | Mod: PBBFAC,,, | Performed by: PHYSICIAN ASSISTANT

## 2023-12-21 PROCEDURE — 3078F PR MOST RECENT DIASTOLIC BLOOD PRESSURE < 80 MM HG: ICD-10-PCS | Mod: CPTII,S$GLB,, | Performed by: PHYSICIAN ASSISTANT

## 2023-12-21 PROCEDURE — 3044F HG A1C LEVEL LT 7.0%: CPT | Mod: CPTII,S$GLB,, | Performed by: PHYSICIAN ASSISTANT

## 2023-12-21 PROCEDURE — 99213 OFFICE O/P EST LOW 20 MIN: CPT | Mod: S$GLB,,, | Performed by: PHYSICIAN ASSISTANT

## 2023-12-21 PROCEDURE — 1160F RVW MEDS BY RX/DR IN RCRD: CPT | Mod: CPTII,S$GLB,, | Performed by: PHYSICIAN ASSISTANT

## 2023-12-21 PROCEDURE — 3008F PR BODY MASS INDEX (BMI) DOCUMENTED: ICD-10-PCS | Mod: CPTII,S$GLB,, | Performed by: PHYSICIAN ASSISTANT

## 2023-12-21 RX ORDER — AMOXICILLIN AND CLAVULANATE POTASSIUM 875; 125 MG/1; MG/1
1 TABLET, FILM COATED ORAL EVERY 12 HOURS
Qty: 20 TABLET | Refills: 0 | Status: SHIPPED | OUTPATIENT
Start: 2023-12-21 | End: 2023-12-31

## 2023-12-21 NOTE — PATIENT INSTRUCTIONS
Joel Oakes,     If you are due for any health screening(s) below please notify me so we can arrange them to be ordered and scheduled. Most healthy patients at your age complete them, but you are free to accept or refuse.     If you can't do it, I'll definitely understand. If you can, I'd certainly appreciate it!    All of your core healthy metrics are met.

## 2023-12-21 NOTE — PROGRESS NOTES
Assessment/Plan:    Problem List Items Addressed This Visit    None  Visit Diagnoses       Acute right otitis media    -  Primary    Relevant Medications    amoxicillin-clavulanate 875-125mg (AUGMENTIN) 875-125 mg per tablet        -start antibiotics as prescribed  -recommend OTC Tylenol/Ibuprofen for pain/fever  -follow up if no improvement in symptoms   -ER precautions for severe or worsening of symptoms     Follow up if symptoms worsen or fail to improve.    Delores Porter PA-C  _____________________________________________________________________________________________________________________________________________________    CC: R ear pain    HPI: Patient is in clinic today as an established patient here for R ear pain. Symptom onset was about 5 days ago, and has been unchanged since that time. She reports noticing symptoms after traveling home from [place] on an airplane. Pain radiates to the R side of her face and neck. She has had nasal congestion otherwise denies fever or any other URI symptoms. She has not tried anything for relief of symptoms. She denies previous ear infections. No other complaints today.     Past Medical History:   Diagnosis Date    Anxiety     Delivery of pregnancy by  section     Depression     Digestive disorder     diarrhea    Gestational diabetes     Pre-eclampsia in third trimester 2023    Unspecified pre-eclampsia, unspecified trimester      Past Surgical History:   Procedure Laterality Date     SECTION  23    COLONOSCOPY N/A 2021    Procedure: COLONOSCOPY;  Surgeon: Gayle Mcneill MD;  Location: [facility];  Service: Endoscopy;  Laterality: N/A;    ESOPHAGOGASTRODUODENOSCOPY N/A 2021    Procedure: EGD (ESOPHAGOGASTRODUODENOSCOPY);  Surgeon: Gayle Mcneill MD;  Location: [facility];  Service: Endoscopy;  Laterality: N/A;    WISDOM TOOTH EXTRACTION       Review of patient's allergies indicates:   Allergen Reactions    Corticosteroids  (glucocorticoids) Palpitations     Oral steroid     Social History     Tobacco Use    Smoking status: Never    Smokeless tobacco: Never   Substance Use Topics    Alcohol use: Not Currently    Drug use: Never     Family History   Problem Relation Age of Onset    Alcohol abuse Mother     Depression Mother     Drug abuse Mother     Diabetes Father         Prediabetic    Hearing loss Father     Hyperlipidemia Father     Miscarriages / Stillbirths Sister     Cancer Paternal Grandfather         Colorectal cancer    Hearing loss Paternal Grandfather     Stroke Paternal Grandfather     Diabetes Maternal Aunt     Miscarriages / Stillbirths Maternal Aunt         Cannot have children     Current Outpatient Medications on File Prior to Visit   Medication Sig Dispense Refill    FLUoxetine 20 MG capsule       Lactobacillus rhamnosus GG (CULTURELLE) 10 billion cell capsule Take 1 capsule by mouth once daily.      omega-3/dha/epa/dpa/fish oil (OMEGA-3 2100 ORAL)       prenatal vit/iron fum/folic ac (PRENATAL 1+1 ORAL)        No current facility-administered medications on file prior to visit.       Review of Systems   Constitutional:  Negative for chills, diaphoresis, fatigue and fever.   HENT:  Positive for ear pain. Negative for congestion, postnasal drip, sinus pain and sore throat.    Eyes:  Negative for pain and redness.   Respiratory:  Negative for cough, chest tightness and shortness of breath.    Cardiovascular:  Negative for chest pain and leg swelling.   Gastrointestinal:  Negative for abdominal pain, constipation, diarrhea, nausea and vomiting.   Genitourinary:  Negative for dysuria and hematuria.   Musculoskeletal:  Negative for arthralgias and joint swelling.   Skin:  Negative for rash.   Neurological:  Negative for dizziness, syncope and headaches.   Psychiatric/Behavioral:  Negative for dysphoric mood. The patient is not nervous/anxious.        Vitals:    12/21/23 0919   BP: 113/73   BP Location: Right arm   Patient  "Position: Sitting   Pulse: 96   Resp: 16   Temp: 98.4 °F (36.9 °C)   TempSrc: Oral   SpO2: 99%   Weight: 55.8 kg (123 lb 1.6 oz)   Height: 5' 2" (1.575 m)       Wt Readings from Last 3 Encounters:   12/21/23 55.8 kg (123 lb 1.6 oz)   05/15/23 54 kg (119 lb)   03/30/23 53.1 kg (117 lb)       Physical Exam  Constitutional:       General: She is not in acute distress.     Appearance: Normal appearance. She is well-developed.   HENT:      Head: Normocephalic and atraumatic.      Right Ear: Tenderness present. Tympanic membrane is erythematous.      Left Ear: Tympanic membrane normal.      Nose: Nose normal.      Mouth/Throat:      Mouth: Mucous membranes are moist.      Pharynx: Oropharynx is clear.   Eyes:      Conjunctiva/sclera: Conjunctivae normal.   Cardiovascular:      Rate and Rhythm: Normal rate and regular rhythm.      Pulses: Normal pulses.      Heart sounds: Normal heart sounds. No murmur heard.  Pulmonary:      Effort: Pulmonary effort is normal. No respiratory distress.      Breath sounds: Normal breath sounds.   Abdominal:      General: Bowel sounds are normal. There is no distension.      Palpations: Abdomen is soft.      Tenderness: There is no abdominal tenderness.   Musculoskeletal:         General: Normal range of motion.      Cervical back: Normal range of motion and neck supple.   Skin:     General: Skin is warm and dry.      Findings: No rash.   Neurological:      General: No focal deficit present.      Mental Status: She is alert and oriented to person, place, and time.   Psychiatric:         Mood and Affect: Mood normal.         Behavior: Behavior normal.         Health Maintenance   Topic Date Due    TETANUS VACCINE  01/03/2033    Hepatitis C Screening  Completed    Lipid Panel  Completed     "

## 2024-02-29 LAB
HUMAN PAPILLOMAVIRUS (HPV): NORMAL
PAP RECOMMENDATION EXT: NORMAL

## 2024-04-01 NOTE — TELEPHONE ENCOUNTER
No care due was identified.  [facility] Embedded Care Due Messages. Reference number: [id number].   4/01/2024 9:06:39 AM CDT

## 2024-04-01 NOTE — TELEPHONE ENCOUNTER
----- Message from Kavya Duke sent at 4/1/2024  8:54 AM CDT -----  Contact: Violette Cates is calling in regards to requesting refills on the medication FLUoxetine 20 MG capsule and would like to discuss getting a 90 day fill on the medication if possible.  Please call back at  [phone]     [facility] - [place] - [address] [address] [facility] [place] [address]  Phone: [phone] Fax: [phone]  Hours: Not open 24 hours        Thanks

## 2024-04-02 RX ORDER — FLUOXETINE HYDROCHLORIDE 20 MG/1
20 CAPSULE ORAL DAILY
Qty: 90 CAPSULE | Refills: 0 | Status: SHIPPED | OUTPATIENT
Start: 2024-04-02 | End: 2024-05-16 | Stop reason: SDUPTHER

## 2024-04-02 NOTE — TELEPHONE ENCOUNTER
Refill Routing Note   Medication(s) are not appropriate for processing by [facility] for the following reason(s):        No active prescription written by provider    [facility] action(s):  Defer        Medication Therapy Plan: historical provider rx      Appointments  past 12m or future 3m with PCP    Date Provider   Last Visit   5/15/2023 Maureen Burr MD   Next Visit   5/16/2024 Maureen Burr MD   ED visits in past 90 days: 0        Note composed:11:23 AM 04/02/2024

## 2024-05-14 ENCOUNTER — PATIENT MESSAGE (OUTPATIENT)
Dept: FAMILY MEDICINE | Facility: CLINIC | Age: 34
End: 2024-05-14
Payer: COMMERCIAL

## 2024-05-16 ENCOUNTER — OFFICE VISIT (OUTPATIENT)
Dept: FAMILY MEDICINE | Facility: CLINIC | Age: 34
End: 2024-05-16
Payer: COMMERCIAL

## 2024-05-16 ENCOUNTER — LAB VISIT (OUTPATIENT)
Dept: LAB | Facility: HOSPITAL | Age: 34
End: 2024-05-16
Attending: INTERNAL MEDICINE
Payer: COMMERCIAL

## 2024-05-16 VITALS
HEART RATE: 77 BPM | DIASTOLIC BLOOD PRESSURE: 68 MMHG | SYSTOLIC BLOOD PRESSURE: 102 MMHG | WEIGHT: 126 LBS | TEMPERATURE: 98 F | BODY MASS INDEX: 23.19 KG/M2 | HEIGHT: 62 IN

## 2024-05-16 DIAGNOSIS — Z87.59 HISTORY OF PRE-ECLAMPSIA: Primary | ICD-10-CM

## 2024-05-16 DIAGNOSIS — Z87.59 HISTORY OF PRE-ECLAMPSIA: ICD-10-CM

## 2024-05-16 DIAGNOSIS — Z00.00 ENCOUNTER FOR ANNUAL HEALTH EXAMINATION: ICD-10-CM

## 2024-05-16 DIAGNOSIS — Z13.6 ENCOUNTER FOR LIPID SCREENING FOR CARDIOVASCULAR DISEASE: ICD-10-CM

## 2024-05-16 DIAGNOSIS — Z13.220 ENCOUNTER FOR LIPID SCREENING FOR CARDIOVASCULAR DISEASE: ICD-10-CM

## 2024-05-16 LAB
BILIRUB UR QL STRIP: NEGATIVE
CLARITY UR REFRACT.AUTO: CLEAR
COLOR UR AUTO: COLORLESS
GLUCOSE UR QL STRIP: NEGATIVE
HGB UR QL STRIP: ABNORMAL
KETONES UR QL STRIP: NEGATIVE
LEUKOCYTE ESTERASE UR QL STRIP: NEGATIVE
MICROSCOPIC COMMENT: NORMAL
NITRITE UR QL STRIP: NEGATIVE
PH UR STRIP: 7 [PH] (ref 5–8)
PROT UR QL STRIP: NEGATIVE
RBC #/AREA URNS AUTO: 0 /HPF (ref 0–4)
SP GR UR STRIP: 1 (ref 1–1.03)
SQUAMOUS #/AREA URNS AUTO: 5 /HPF
URN SPEC COLLECT METH UR: ABNORMAL
WBC #/AREA URNS AUTO: 0 /HPF (ref 0–5)

## 2024-05-16 PROCEDURE — 3008F BODY MASS INDEX DOCD: CPT | Mod: CPTII,S$GLB,, | Performed by: INTERNAL MEDICINE

## 2024-05-16 PROCEDURE — 99395 PREV VISIT EST AGE 18-39: CPT | Mod: S$GLB,,, | Performed by: INTERNAL MEDICINE

## 2024-05-16 PROCEDURE — 3078F DIAST BP <80 MM HG: CPT | Mod: CPTII,S$GLB,, | Performed by: INTERNAL MEDICINE

## 2024-05-16 PROCEDURE — 81001 URINALYSIS AUTO W/SCOPE: CPT | Performed by: INTERNAL MEDICINE

## 2024-05-16 PROCEDURE — 99999 PR PBB SHADOW E&M-EST. PATIENT-LVL III: CPT | Mod: PBBFAC,,, | Performed by: INTERNAL MEDICINE

## 2024-05-16 PROCEDURE — 3074F SYST BP LT 130 MM HG: CPT | Mod: CPTII,S$GLB,, | Performed by: INTERNAL MEDICINE

## 2024-05-16 PROCEDURE — 1159F MED LIST DOCD IN RCRD: CPT | Mod: CPTII,S$GLB,, | Performed by: INTERNAL MEDICINE

## 2024-05-16 RX ORDER — FLUOXETINE HYDROCHLORIDE 20 MG/1
20 CAPSULE ORAL DAILY
Qty: 90 CAPSULE | Refills: 3 | Status: SHIPPED | OUTPATIENT
Start: 2024-05-16

## 2024-05-17 ENCOUNTER — PATIENT MESSAGE (OUTPATIENT)
Dept: FAMILY MEDICINE | Facility: CLINIC | Age: 34
End: 2024-05-17
Payer: COMMERCIAL

## 2024-05-21 ENCOUNTER — PATIENT OUTREACH (OUTPATIENT)
Dept: ADMINISTRATIVE | Facility: HOSPITAL | Age: 34
End: 2024-05-21
Payer: COMMERCIAL

## 2024-05-21 NOTE — PROGRESS NOTES
HM GAP: Received msg from PCP Pap completed at [facility]. Pap/HPV uploaded, entered, & linked.

## 2024-07-18 DIAGNOSIS — Z76.89 ENCOUNTER TO ESTABLISH CARE: Primary | ICD-10-CM

## 2024-07-22 ENCOUNTER — OFFICE VISIT (OUTPATIENT)
Dept: CARDIOLOGY | Facility: CLINIC | Age: 34
End: 2024-07-22
Payer: COMMERCIAL

## 2024-07-22 ENCOUNTER — HOSPITAL ENCOUNTER (OUTPATIENT)
Dept: CARDIOLOGY | Facility: HOSPITAL | Age: 34
Discharge: HOME | End: 2024-07-22
Attending: INTERNAL MEDICINE
Payer: COMMERCIAL

## 2024-07-22 ENCOUNTER — TELEPHONE (OUTPATIENT)
Dept: CARDIOLOGY | Facility: CLINIC | Age: 34
End: 2024-07-22

## 2024-07-22 VITALS
BODY MASS INDEX: 22.52 KG/M2 | DIASTOLIC BLOOD PRESSURE: 68 MMHG | OXYGEN SATURATION: 99 % | SYSTOLIC BLOOD PRESSURE: 118 MMHG | HEART RATE: 80 BPM | HEIGHT: 62 IN | WEIGHT: 122.38 LBS

## 2024-07-22 DIAGNOSIS — Z76.89 ENCOUNTER TO ESTABLISH CARE: ICD-10-CM

## 2024-07-22 DIAGNOSIS — E78.00 PURE HYPERCHOLESTEROLEMIA: Primary | ICD-10-CM

## 2024-07-22 PROCEDURE — 99999 PR PBB SHADOW E&M-EST. PATIENT-LVL III: CPT | Mod: PBBFAC,,, | Performed by: INTERNAL MEDICINE

## 2024-07-22 PROCEDURE — 93005 ELECTROCARDIOGRAM TRACING: CPT | Mod: PO

## 2024-07-22 PROCEDURE — 93010 ELECTROCARDIOGRAM REPORT: CPT | Mod: ,,, | Performed by: INTERNAL MEDICINE

## 2024-07-22 PROCEDURE — 1159F MED LIST DOCD IN RCRD: CPT | Mod: CPTII,S$GLB,, | Performed by: INTERNAL MEDICINE

## 2024-07-22 PROCEDURE — 3008F BODY MASS INDEX DOCD: CPT | Mod: CPTII,S$GLB,, | Performed by: INTERNAL MEDICINE

## 2024-07-22 PROCEDURE — 3078F DIAST BP <80 MM HG: CPT | Mod: CPTII,S$GLB,, | Performed by: INTERNAL MEDICINE

## 2024-07-22 PROCEDURE — 3074F SYST BP LT 130 MM HG: CPT | Mod: CPTII,S$GLB,, | Performed by: INTERNAL MEDICINE

## 2024-07-22 PROCEDURE — 99203 OFFICE O/P NEW LOW 30 MIN: CPT | Mod: 25,S$GLB,, | Performed by: INTERNAL MEDICINE

## 2024-07-22 RX ORDER — UBIDECARENONE 30 MG
30 CAPSULE ORAL DAILY
COMMUNITY
Start: 2024-05-31

## 2024-07-23 LAB
OHS QRS DURATION: 82 MS
OHS QTC CALCULATION: 436 MS

## 2024-08-19 ENCOUNTER — PATIENT MESSAGE (OUTPATIENT)
Dept: FAMILY MEDICINE | Facility: CLINIC | Age: 34
End: 2024-08-19
Payer: COMMERCIAL

## 2024-08-19 ENCOUNTER — LAB VISIT (OUTPATIENT)
Dept: LAB | Facility: HOSPITAL | Age: 34
End: 2024-08-19
Attending: INTERNAL MEDICINE
Payer: COMMERCIAL

## 2024-08-19 DIAGNOSIS — Z00.00 ENCOUNTER FOR ANNUAL HEALTH EXAMINATION: ICD-10-CM

## 2024-08-19 DIAGNOSIS — Z13.220 ENCOUNTER FOR LIPID SCREENING FOR CARDIOVASCULAR DISEASE: ICD-10-CM

## 2024-08-19 DIAGNOSIS — Z13.6 ENCOUNTER FOR LIPID SCREENING FOR CARDIOVASCULAR DISEASE: ICD-10-CM

## 2024-08-19 LAB
CHOLEST SERPL-MCNC: 203 MG/DL (ref 120–199)
CHOLEST/HDLC SERPL: 3.5 {RATIO} (ref 2–5)
HDLC SERPL-MCNC: 58 MG/DL (ref 40–75)
HDLC SERPL: 28.6 % (ref 20–50)
LDLC SERPL CALC-MCNC: 128.4 MG/DL (ref 63–159)
NONHDLC SERPL-MCNC: 145 MG/DL
TRIGL SERPL-MCNC: 83 MG/DL (ref 30–150)

## 2024-08-19 PROCEDURE — 36415 COLL VENOUS BLD VENIPUNCTURE: CPT | Mod: PO | Performed by: INTERNAL MEDICINE

## 2024-08-19 PROCEDURE — 80061 LIPID PANEL: CPT | Performed by: INTERNAL MEDICINE

## 2024-08-19 NOTE — PROGRESS NOTES
Results have been released via [employer]. Please verify that these have been viewed by patient. If not, please call patient with results.      I have sent a message to them with the following interpretation (see below).    I have reviewed your recent results.    Cholesterol is much better, now in normal ranges.     Please do not hesitate to call or message with any additional questions or concerns.    Maureen Burr MD

## 2024-08-27 ENCOUNTER — OFFICE VISIT (OUTPATIENT)
Dept: PRIMARY CARE CLINIC | Facility: CLINIC | Age: 34
End: 2024-08-27
Payer: COMMERCIAL

## 2024-08-27 VITALS — WEIGHT: 118 LBS | BODY MASS INDEX: 21.71 KG/M2 | HEIGHT: 62 IN

## 2024-08-27 PROCEDURE — 1160F RVW MEDS BY RX/DR IN RCRD: CPT | Mod: CPTII,95,, | Performed by: INTERNAL MEDICINE

## 2024-08-27 PROCEDURE — 3008F BODY MASS INDEX DOCD: CPT | Mod: CPTII,95,, | Performed by: INTERNAL MEDICINE

## 2024-08-27 PROCEDURE — 99213 OFFICE O/P EST LOW 20 MIN: CPT | Mod: 95,,, | Performed by: INTERNAL MEDICINE

## 2024-08-27 PROCEDURE — 1159F MED LIST DOCD IN RCRD: CPT | Mod: CPTII,95,, | Performed by: INTERNAL MEDICINE

## 2024-08-27 RX ORDER — FLUOXETINE 10 MG/1
TABLET ORAL
Qty: 28 TABLET | Refills: 0 | Status: SHIPPED | OUTPATIENT
Start: 2024-08-27

## 2024-08-29 NOTE — PROGRESS NOTES
Primary Care Telemedicine Note  The patient location is:  Patient Home   The chief complaint leading to consultation is: medication change  Total time spent with patient: 10 min    Visit type: Virtual visit with synchronous audio and video  Each patient to whom he or she provides medical services by telemedicine is:  (1) informed of the relationship between the physician and patient and the respective role of any other health care provider with respect to management of the patient; and (2) notified that he or she may decline to receive medical services by telemedicine and may withdraw from such care at any time.    Assessment/Plan:    Problem List Items Addressed This Visit          Psychiatric    Post partum depression - Primary    Overview     -has been on prozac 20 mg with good response  -symptoms much improved and she would like to wean from the medication  -instruction provided to properly titrate off of medication  -will follow up as needed         Relevant Medications    FLUoxetine 10 MG Colton Burr MD  _____________________________________________________________________________________________________________________________________________________    HPI:    Patient is an established patient who presents today via virtual visit.    History of Present Illness  The patient is presenting virtually to discuss a medication change for her depression/anxiety treatment.    She is currently taking Prozac 20 mg and has expressed a desire to discontinue its use, understanding that this process may take some time. She recently refilled her prescription as it was due to  in two days. She maintains a regular exercise routine, visiting the gym approximately five days a week, which she finds beneficial for her overall well-being and mental health. She would like to see if the medication is still needed or not. She denies any current symptoms of anxiety or depression.     No other new complaints  today.      Past Medical History:  Past Medical History:   Diagnosis Date    Anxiety     Delivery of pregnancy by  section     Depression     Digestive disorder     diarrhea    Gestational diabetes     Pre-eclampsia in third trimester 2023    Unspecified pre-eclampsia, unspecified trimester        Current Outpatient Medications on File Prior to Visit   Medication Sig Dispense Refill    MULTIVITAMIN ORAL Take by mouth.      omega-3/dha/epa/dpa/fish oil (OMEGA-3 2100 ORAL)       [DISCONTINUED] berberine chlor-seaweed-chrom 500 mg-250 mg- 7.5 mcg Cap Take 1 capsule by mouth once daily.      [DISCONTINUED] co-enzyme Q-10 (COQ-10) 30 mg capsule Take 30 mg by mouth once daily.       No current facility-administered medications on file prior to visit.       Review of Systems   Constitutional:  Negative for chills, fever and malaise/fatigue.   HENT:  Negative for hearing loss.    Eyes:  Negative for discharge.   Respiratory:  Negative for cough, shortness of breath and wheezing.    Cardiovascular:  Negative for chest pain, palpitations and leg swelling.   Gastrointestinal:  Negative for abdominal pain, blood in stool, constipation, diarrhea and vomiting.   Genitourinary:  Negative for dysuria, frequency and hematuria.   Musculoskeletal:  Negative for back pain, joint pain and neck pain.   Neurological:  Negative for weakness and headaches.   Endo/Heme/Allergies:  Negative for polydipsia.   Psychiatric/Behavioral:  Negative for depression. The patient is not nervous/anxious.          Physical Exam   There were no vitals filed for this visit.     Physical Exam  Constitutional:       General: She is not in acute distress.     Appearance: She is well-developed.   HENT:      Head: Normocephalic and atraumatic.   Pulmonary:      Effort: Pulmonary effort is normal. No respiratory distress.   Abdominal:      General: There is no distension.   Musculoskeletal:         General: Normal range of motion.      Cervical  back: Normal range of motion.   Neurological:      Mental Status: She is alert and oriented to person, place, and time.   Psychiatric:         Mood and Affect: Mood normal.         Behavior: Behavior normal.           DISCLAIMER: This note was compiled by using a speech recognition dictation system and therefore please be aware that typographical / speech recognition errors can and do occur.  Please contact me if you see any errors specifically.  Consent was obtained for [employer] recording system prior to the visit.  Answers submitted by the patient for this visit:  Review of Systems Questionnaire (Submitted on 8/26/2024)  activity change: No  unexpected weight change: No  rhinorrhea: No  trouble swallowing: No  visual disturbance: No  chest tightness: No  polyuria: No  difficulty urinating: No  menstrual problem: No  joint swelling: No  arthralgias: No  confusion: No  dysphoric mood: No

## 2024-10-03 ENCOUNTER — OFFICE VISIT (OUTPATIENT)
Dept: FAMILY MEDICINE | Facility: CLINIC | Age: 34
End: 2024-10-03
Payer: COMMERCIAL

## 2024-10-03 VITALS
WEIGHT: 120 LBS | RESPIRATION RATE: 16 BRPM | OXYGEN SATURATION: 99 % | DIASTOLIC BLOOD PRESSURE: 73 MMHG | HEART RATE: 89 BPM | BODY MASS INDEX: 22.08 KG/M2 | SYSTOLIC BLOOD PRESSURE: 113 MMHG | HEIGHT: 62 IN

## 2024-10-03 DIAGNOSIS — L08.9 INFECTED INSECT BITE OF RIGHT THIGH, INITIAL ENCOUNTER: Primary | ICD-10-CM
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
## 2024-10-03 DIAGNOSIS — S70.361A INFECTED INSECT BITE OF RIGHT THIGH, INITIAL ENCOUNTER: Primary | ICD-10-CM

## 2024-10-03 PROCEDURE — 1160F RVW MEDS BY RX/DR IN RCRD: CPT | Mod: CPTII,S$GLB,, | Performed by: PHYSICIAN ASSISTANT

## 2024-10-03 PROCEDURE — 1159F MED LIST DOCD IN RCRD: CPT | Mod: CPTII,S$GLB,, | Performed by: PHYSICIAN ASSISTANT

## 2024-10-03 PROCEDURE — 3008F BODY MASS INDEX DOCD: CPT | Mod: CPTII,S$GLB,, | Performed by: PHYSICIAN ASSISTANT

## 2024-10-03 PROCEDURE — 99999 PR PBB SHADOW E&M-EST. PATIENT-LVL IV: CPT | Mod: PBBFAC,,, | Performed by: PHYSICIAN ASSISTANT

## 2024-10-03 PROCEDURE — 3074F SYST BP LT 130 MM HG: CPT | Mod: CPTII,S$GLB,, | Performed by: PHYSICIAN ASSISTANT

## 2024-10-03 PROCEDURE — 3078F DIAST BP <80 MM HG: CPT | Mod: CPTII,S$GLB,, | Performed by: PHYSICIAN ASSISTANT

## 2024-10-03 PROCEDURE — 99213 OFFICE O/P EST LOW 20 MIN: CPT | Mod: S$GLB,,, | Performed by: PHYSICIAN ASSISTANT

## 2024-10-03 RX ORDER — MUPIROCIN 20 MG/G
OINTMENT TOPICAL 2 TIMES DAILY
Qty: 30 G | Refills: 0 | Status: SHIPPED | OUTPATIENT
Start: 2024-10-03

## 2024-10-03 RX ORDER — DOXYCYCLINE 100 MG/1
100 CAPSULE ORAL 2 TIMES DAILY
Qty: 14 CAPSULE | Refills: 0 | Status: SHIPPED | OUTPATIENT
Start: 2024-10-03 | End: 2024-10-10

## 2024-10-03 NOTE — PROGRESS NOTES
Assessment/Plan:    Problem List Items Addressed This Visit    None  Visit Diagnoses       Infected insect bite of right thigh, initial encounter    -  Primary    Relevant Medications    mupirocin (BACTROBAN) 2 % ointment    doxycycline (VIBRAMYCIN) 100 MG Cap        -start antibiotics as prescribed  -recommend warm compresses  -OTC Tylenol/Ibuprofen for pain  -follow up if no improvement in symptoms   -ER precautions for severe or worsening of symptoms     Follow up if symptoms worsen or fail to improve.    Delores Porter PA-C  _____________________________________________________________________________________________________________________________________________________    CC: possible infected bug bite    HPI: Patient is in clinic today as an established patient here for a possible infected bug bite.    INFECTED BUG BITE:  She first noticed a bug bite on the back of her right thigh three days ago. The bite is draining pus and is tender to touch. She denies fever. She believes the bite occurred while sleeping. She has been keeping the area clean by washing it with soap and drying it, but has not applied any topical antibiotics. She is concerned for infection. No other complaints today.    Past Medical History:   Diagnosis Date    Anxiety     Delivery of pregnancy by  section     Depression     Digestive disorder     diarrhea    Gestational diabetes     Pre-eclampsia in third trimester 2023    Unspecified pre-eclampsia, unspecified trimester      Past Surgical History:   Procedure Laterality Date     SECTION  23    COLONOSCOPY N/A 2021    Procedure: COLONOSCOPY;  Surgeon: Gayle Mcneill MD;  Location: [facility];  Service: Endoscopy;  Laterality: N/A;    ESOPHAGOGASTRODUODENOSCOPY N/A 2021    Procedure: EGD (ESOPHAGOGASTRODUODENOSCOPY);  Surgeon: Gayle Mcneill MD;  Location: [facility];  Service: Endoscopy;  Laterality: N/A;    WISDOM TOOTH EXTRACTION        Review of patient's allergies indicates:   Allergen Reactions    Corticosteroids (glucocorticoids) Palpitations     Oral steroid     Social History     Tobacco Use    Smoking status: Never    Smokeless tobacco: Never   Substance Use Topics    Alcohol use: Not Currently    Drug use: Never     Family History   Problem Relation Name Age of Onset    Alcohol abuse Mother Maeve Pierre     Depression Mother Maeve Pierre     Drug abuse Mother Maeve Pierre     Diabetes Father Derrick Adams         Prediabetic    Hearing loss Father Derrick Adams     Hyperlipidemia Father Derrick Adams     Miscarriages / Stillbirths Sister Arnaldo Quiroz     Cancer Paternal Grandfather FARHAD         Colorectal cancer    Hearing loss Paternal Grandfather JM     Stroke Paternal Grandfather JM     Diabetes Maternal Aunt Great Aunt Marcie     Miscarriages / Stillbirths Maternal Aunt Great Aunt Marcie         Cannot have children     Current Outpatient Medications on File Prior to Visit   Medication Sig Dispense Refill    MULTIVITAMIN ORAL Take by mouth.      omega-3/dha/epa/dpa/fish oil (OMEGA-3 2100 ORAL)       [DISCONTINUED] FLUoxetine 10 MG Tab Take 1 tablet once daily for 14 days, followed by 1/2 tablet for 7-14 days, followed by 1/2 tablet every other day for 7-14 days 28 tablet 0     No current facility-administered medications on file prior to visit.       Review of Systems   Constitutional:  Negative for activity change, chills, diaphoresis, fatigue, fever and unexpected weight change.   HENT:  Negative for congestion, ear pain, hearing loss, postnasal drip, rhinorrhea, sinus pain, sore throat and trouble swallowing.    Eyes:  Negative for pain, discharge, redness and visual disturbance.   Respiratory:  Negative for cough, chest tightness, shortness of breath and wheezing.    Cardiovascular:  Negative for chest pain, palpitations and leg swelling.   Gastrointestinal:  Negative for abdominal pain, blood in stool, constipation, diarrhea,  "nausea and vomiting.   Endocrine: Negative for polydipsia and polyuria.   Genitourinary:  Negative for difficulty urinating, dysuria, hematuria and menstrual problem.   Musculoskeletal:  Negative for arthralgias, joint swelling and neck pain.   Skin:  Negative for rash.   Neurological:  Negative for dizziness, syncope, weakness and headaches.   Psychiatric/Behavioral:  Negative for confusion and dysphoric mood. The patient is not nervous/anxious.        Vitals:    10/03/24 0910   BP: 113/73   Pulse: 89   Resp: 16   SpO2: 99%   Weight: 54.4 kg (120 lb)   Height: 5' 2" (1.575 m)       Wt Readings from Last 3 Encounters:   10/03/24 54.4 kg (120 lb)   08/27/24 53.5 kg (118 lb)   07/22/24 55.5 kg (122 lb 6.4 oz)       Physical Exam  Constitutional:       General: She is not in acute distress.     Appearance: Normal appearance. She is well-developed.   HENT:      Head: Normocephalic and atraumatic.   Eyes:      Conjunctiva/sclera: Conjunctivae normal.   Cardiovascular:      Rate and Rhythm: Normal rate and regular rhythm.      Pulses: Normal pulses.      Heart sounds: Normal heart sounds. No murmur heard.  Pulmonary:      Effort: Pulmonary effort is normal. No respiratory distress.      Breath sounds: Normal breath sounds.   Abdominal:      General: Bowel sounds are normal. There is no distension.      Palpations: Abdomen is soft.      Tenderness: There is no abdominal tenderness.   Musculoskeletal:         General: Normal range of motion.      Cervical back: Normal range of motion and neck supple.   Skin:     General: Skin is warm and dry.      Findings: No rash.      Comments: +suspected insect bite to R posterior thigh w/ redness, tenderness and drainage present on exam    Neurological:      General: No focal deficit present.      Mental Status: She is alert and oriented to person, place, and time.   Psychiatric:         Mood and Affect: Mood normal.         Behavior: Behavior normal.         Health Maintenance   Topic " Date Due    TETANUS VACCINE  01/03/2033    Hepatitis C Screening  Completed    Lipid Panel  Completed     DISCLAIMER: This note was compiled by using a speech recognition dictation system and therefore please be aware that typographical / speech recognition errors can and do occur.  Please contact me if you see any errors specifically.  Consent was obtained for [employer] recording system prior to the visit.

## 2024-10-12 ENCOUNTER — PATIENT MESSAGE (OUTPATIENT)
Dept: PRIMARY CARE CLINIC | Facility: CLINIC | Age: 34
End: 2024-10-12
Payer: COMMERCIAL

## 2024-10-15 ENCOUNTER — HOSPITAL ENCOUNTER (OUTPATIENT)
Dept: RADIOLOGY | Facility: HOSPITAL | Age: 34
Discharge: HOME | End: 2024-10-15
Attending: PHYSICIAN ASSISTANT
Payer: COMMERCIAL

## 2024-10-15 ENCOUNTER — OFFICE VISIT (OUTPATIENT)
Dept: FAMILY MEDICINE | Facility: CLINIC | Age: 34
End: 2024-10-15
Payer: COMMERCIAL

## 2024-10-15 VITALS
HEIGHT: 62 IN | OXYGEN SATURATION: 99 % | SYSTOLIC BLOOD PRESSURE: 115 MMHG | BODY MASS INDEX: 22.95 KG/M2 | WEIGHT: 124.69 LBS | HEART RATE: 82 BPM | DIASTOLIC BLOOD PRESSURE: 76 MMHG | RESPIRATION RATE: 16 BRPM

## 2024-10-15 DIAGNOSIS — R22.1 LOCALIZED SWELLING, MASS AND LUMP, NECK: ICD-10-CM

## 2024-10-15 DIAGNOSIS — R22.1 LOCALIZED SWELLING, MASS AND LUMP, NECK: Primary | ICD-10-CM

## 2024-10-15 PROCEDURE — 1160F RVW MEDS BY RX/DR IN RCRD: CPT | Mod: CPTII,S$GLB,, | Performed by: PHYSICIAN ASSISTANT

## 2024-10-15 PROCEDURE — 76536 US EXAM OF HEAD AND NECK: CPT | Mod: TC,PO

## 2024-10-15 PROCEDURE — 3008F BODY MASS INDEX DOCD: CPT | Mod: CPTII,S$GLB,, | Performed by: PHYSICIAN ASSISTANT

## 2024-10-15 PROCEDURE — 99999 PR PBB SHADOW E&M-EST. PATIENT-LVL IV: CPT | Mod: PBBFAC,,, | Performed by: PHYSICIAN ASSISTANT

## 2024-10-15 PROCEDURE — 3074F SYST BP LT 130 MM HG: CPT | Mod: CPTII,S$GLB,, | Performed by: PHYSICIAN ASSISTANT

## 2024-10-15 PROCEDURE — 99213 OFFICE O/P EST LOW 20 MIN: CPT | Mod: S$GLB,,, | Performed by: PHYSICIAN ASSISTANT

## 2024-10-15 PROCEDURE — 76536 US EXAM OF HEAD AND NECK: CPT | Mod: 26,,, | Performed by: RADIOLOGY

## 2024-10-15 PROCEDURE — 1159F MED LIST DOCD IN RCRD: CPT | Mod: CPTII,S$GLB,, | Performed by: PHYSICIAN ASSISTANT

## 2024-10-15 PROCEDURE — 3078F DIAST BP <80 MM HG: CPT | Mod: CPTII,S$GLB,, | Performed by: PHYSICIAN ASSISTANT

## 2024-10-15 NOTE — PROGRESS NOTES
Results have been released via [employer]. Please verify that these have been viewed by patient. If not, please call patient with results.     I have sent a message to them with the following interpretation (see below).    I have reviewed your recent neck US which showed multiple lymph nodes on the L side which appear to be normal in size. No further testing needed at this time. If symptoms worsen or do not improve, please let me know.      Please do not hesitate to call or message with any additional questions or concerns.    Delores Porter PA-C

## 2024-10-15 NOTE — PROGRESS NOTES
Assessment/Plan:    Problem List Items Addressed This Visit    None  Visit Diagnoses       Localized swelling, mass and lump, neck    -  Primary    Relevant Orders    US Soft Tissue Head Neck (Completed)        -lump on L side of neck >3 days  -will obtain soft tissue US  -consider CT in the future if needed  -ER precautions for severe or worsening of symptoms    Follow up if symptoms worsen or fail to improve.    Delores Porter PA-C  _____________________________________________________________________________________________________________________________________________________    CC: lump on neck    HPI: Patient is in clinic today as an established patient here for lump on neck.    NECK LUMP:  She noticed a painless lump on the left side of her neck on Saturday (3 days ago). The lump has not changed since its discovery. She denies any associated symptoms, including ear pain, sore throat, or fever. She had a massage on Saturday. Previous massages a few months ago did not raise any concerns about neck abnormalities.    RECENT MEDICAL HISTORY:  She recently completed antibiotic treatment with doxycycline, which was prescribed on the  for cellulitis. The infection cleared up within a few days of starting the medication.    No other complaints today.    Past Medical History:   Diagnosis Date    Anxiety     Delivery of pregnancy by  section     Depression     Digestive disorder     diarrhea    Gestational diabetes     Pre-eclampsia in third trimester 2023    Unspecified pre-eclampsia, unspecified trimester      Past Surgical History:   Procedure Laterality Date     SECTION  23    COLONOSCOPY N/A 2021    Procedure: COLONOSCOPY;  Surgeon: Gayle Mcneill MD;  Location: [facility];  Service: Endoscopy;  Laterality: N/A;    ESOPHAGOGASTRODUODENOSCOPY N/A 2021    Procedure: EGD (ESOPHAGOGASTRODUODENOSCOPY);  Surgeon: Gayle Mcneill MD;  Location: [facility];  Service:  Endoscopy;  Laterality: N/A;    WISDOM TOOTH EXTRACTION       Review of patient's allergies indicates:   Allergen Reactions    Corticosteroids (glucocorticoids) Palpitations     Oral steroid     Social History     Tobacco Use    Smoking status: Never    Smokeless tobacco: Never   Substance Use Topics    Alcohol use: Not Currently    Drug use: Never     Family History   Problem Relation Name Age of Onset    Alcohol abuse Mother Maeve Pierre     Depression Mother Maeve Pierre     Drug abuse Mother Maeve Pierre     Diabetes Father Derrick Adams         Prediabetic    Hearing loss Father Derrick Adams     Hyperlipidemia Father Derrick Adams     Miscarriages / Stillbirths Sister Arnaldo Quiroz     Cancer Paternal Grandfather FARHAD         Colorectal cancer    Hearing loss Paternal Grandfather FARHAD     Stroke Paternal Grandfather FARHAD     Diabetes Maternal Aunt Great Aunt Marcie     Miscarriages / Stillbirths Maternal Aunt Great Aunt Marcie         Cannot have children     Current Outpatient Medications on File Prior to Visit   Medication Sig Dispense Refill    MULTIVITAMIN ORAL Take by mouth.      omega-3/dha/epa/dpa/fish oil (OMEGA-3 2100 ORAL)       [DISCONTINUED] mupirocin (BACTROBAN) 2 % ointment Apply topically 2 (two) times daily. 30 g 0     No current facility-administered medications on file prior to visit.       Review of Systems   Constitutional:  Negative for activity change, chills, diaphoresis, fatigue, fever and unexpected weight change.   HENT:  Negative for congestion, ear pain, hearing loss, postnasal drip, rhinorrhea, sinus pain, sore throat and trouble swallowing.    Eyes:  Negative for pain, discharge, redness and visual disturbance.   Respiratory:  Negative for cough, chest tightness, shortness of breath and wheezing.    Cardiovascular:  Negative for chest pain, palpitations and leg swelling.   Gastrointestinal:  Negative for abdominal pain, blood in stool, constipation, diarrhea, nausea and vomiting.  "  Endocrine: Negative for polydipsia and polyuria.   Genitourinary:  Negative for difficulty urinating, dysuria, hematuria and menstrual problem.   Musculoskeletal:  Negative for arthralgias, joint swelling and neck pain.   Skin:  Negative for rash.   Neurological:  Negative for dizziness, syncope, weakness and headaches.   Psychiatric/Behavioral:  Negative for confusion and dysphoric mood. The patient is not nervous/anxious.        Vitals:    10/15/24 1000   BP: 115/76   Pulse: 82   Resp: 16   SpO2: 99%   Weight: 56.6 kg (124 lb 11.2 oz)   Height: 5' 2" (1.575 m)       Wt Readings from Last 3 Encounters:   10/15/24 56.6 kg (124 lb 11.2 oz)   10/03/24 54.4 kg (120 lb)   08/27/24 53.5 kg (118 lb)       Physical Exam  Constitutional:       General: She is not in acute distress.     Appearance: Normal appearance. She is well-developed.   HENT:      Head: Normocephalic and atraumatic.   Eyes:      Conjunctiva/sclera: Conjunctivae normal.   Neck:      Comments: +localized swelling on L side of neck; no tenderness present on exam  Cardiovascular:      Rate and Rhythm: Normal rate and regular rhythm.      Pulses: Normal pulses.      Heart sounds: Normal heart sounds. No murmur heard.  Pulmonary:      Effort: Pulmonary effort is normal. No respiratory distress.      Breath sounds: Normal breath sounds.   Abdominal:      General: Bowel sounds are normal. There is no distension.      Palpations: Abdomen is soft.      Tenderness: There is no abdominal tenderness.   Musculoskeletal:         General: Normal range of motion.      Cervical back: Normal range of motion and neck supple.   Skin:     General: Skin is warm and dry.      Findings: No rash.   Neurological:      General: No focal deficit present.      Mental Status: She is alert and oriented to person, place, and time.   Psychiatric:         Mood and Affect: Mood normal.         Behavior: Behavior normal.         Health Maintenance   Topic Date Due    TETANUS VACCINE  " 01/03/2033    Hepatitis C Screening  Completed    Lipid Panel  Completed     DISCLAIMER: This note was compiled by using a speech recognition dictation system and therefore please be aware that typographical / speech recognition errors can and do occur.  Please contact me if you see any errors specifically.  Consent was obtained for [employer] recording system prior to the visit.

## 2024-11-02 ENCOUNTER — PATIENT MESSAGE (OUTPATIENT)
Dept: CARDIOLOGY | Facility: HOSPITAL | Age: 34
End: 2024-11-02
Payer: COMMERCIAL

## 2024-11-11 ENCOUNTER — LAB VISIT (OUTPATIENT)
Dept: LAB | Facility: HOSPITAL | Age: 34
End: 2024-11-11
Attending: PHYSICIAN ASSISTANT
Payer: COMMERCIAL

## 2024-11-11 ENCOUNTER — OFFICE VISIT (OUTPATIENT)
Dept: FAMILY MEDICINE | Facility: CLINIC | Age: 34
End: 2024-11-11
Payer: COMMERCIAL

## 2024-11-11 ENCOUNTER — HOSPITAL ENCOUNTER (OUTPATIENT)
Dept: RADIOLOGY | Facility: HOSPITAL | Age: 34
Discharge: HOME | End: 2024-11-11
Attending: PHYSICIAN ASSISTANT
Payer: COMMERCIAL

## 2024-11-11 VITALS
DIASTOLIC BLOOD PRESSURE: 78 MMHG | RESPIRATION RATE: 18 BRPM | BODY MASS INDEX: 22.66 KG/M2 | HEIGHT: 62 IN | HEART RATE: 98 BPM | SYSTOLIC BLOOD PRESSURE: 125 MMHG | WEIGHT: 123.13 LBS

## 2024-11-11 DIAGNOSIS — R59.0 LOCALIZED ENLARGED LYMPH NODES: ICD-10-CM

## 2024-11-11 DIAGNOSIS — R59.0 LOCALIZED ENLARGED LYMPH NODES: Primary | ICD-10-CM

## 2024-11-11 LAB
ALBUMIN SERPL BCP-MCNC: 4.2 G/DL (ref 3.5–5.2)
ALP SERPL-CCNC: 46 U/L (ref 40–150)
ALT SERPL W/O P-5'-P-CCNC: 20 U/L (ref 10–44)
ANION GAP SERPL CALC-SCNC: 9 MMOL/L (ref 8–16)
AST SERPL-CCNC: 24 U/L (ref 10–40)
BASOPHILS # BLD AUTO: 0.05 K/UL (ref 0–0.2)
BASOPHILS NFR BLD: 0.6 % (ref 0–1.9)
BILIRUB SERPL-MCNC: 0.4 MG/DL (ref 0.1–1)
BUN SERPL-MCNC: 16 MG/DL (ref 6–20)
CALCIUM SERPL-MCNC: 9.4 MG/DL (ref 8.7–10.5)
CHLORIDE SERPL-SCNC: 105 MMOL/L (ref 95–110)
CO2 SERPL-SCNC: 25 MMOL/L (ref 23–29)
CREAT SERPL-MCNC: 0.8 MG/DL (ref 0.5–1.4)
DIFFERENTIAL METHOD BLD: ABNORMAL
EOSINOPHIL # BLD AUTO: 0.1 K/UL (ref 0–0.5)
EOSINOPHIL NFR BLD: 0.9 % (ref 0–8)
ERYTHROCYTE [DISTWIDTH] IN BLOOD BY AUTOMATED COUNT: 13.2 % (ref 11.5–14.5)
EST. GFR  (NO RACE VARIABLE): >60 ML/MIN/1.73 M^2
GLUCOSE SERPL-MCNC: 79 MG/DL (ref 70–110)
HCT VFR BLD AUTO: 41.3 % (ref 37–48.5)
HGB BLD-MCNC: 13.2 G/DL (ref 12–16)
IMM GRANULOCYTES # BLD AUTO: 0.02 K/UL (ref 0–0.04)
IMM GRANULOCYTES NFR BLD AUTO: 0.2 % (ref 0–0.5)
LYMPHOCYTES # BLD AUTO: 4.1 K/UL (ref 1–4.8)
LYMPHOCYTES NFR BLD: 48.2 % (ref 18–48)
MCH RBC QN AUTO: 28 PG (ref 27–31)
MCHC RBC AUTO-ENTMCNC: 32 G/DL (ref 32–36)
MCV RBC AUTO: 88 FL (ref 82–98)
MONOCYTES # BLD AUTO: 0.7 K/UL (ref 0.3–1)
MONOCYTES NFR BLD: 8.8 % (ref 4–15)
NEUTROPHILS # BLD AUTO: 3.5 K/UL (ref 1.8–7.7)
NEUTROPHILS NFR BLD: 41.3 % (ref 38–73)
NRBC BLD-RTO: 0 /100 WBC
PLATELET # BLD AUTO: 269 K/UL (ref 150–450)
PMV BLD AUTO: 10.8 FL (ref 9.2–12.9)
POTASSIUM SERPL-SCNC: 4.5 MMOL/L (ref 3.5–5.1)
PROT SERPL-MCNC: 7.4 G/DL (ref 6–8.4)
RBC # BLD AUTO: 4.71 M/UL (ref 4–5.4)
SODIUM SERPL-SCNC: 139 MMOL/L (ref 136–145)
WBC # BLD AUTO: 8.45 K/UL (ref 3.9–12.7)

## 2024-11-11 PROCEDURE — 1160F RVW MEDS BY RX/DR IN RCRD: CPT | Mod: CPTII,S$GLB,, | Performed by: PHYSICIAN ASSISTANT

## 2024-11-11 PROCEDURE — 70491 CT SOFT TISSUE NECK W/DYE: CPT | Mod: TC,PO

## 2024-11-11 PROCEDURE — 25500020 PHARM REV CODE 255: Mod: PO | Performed by: PHYSICIAN ASSISTANT

## 2024-11-11 PROCEDURE — 80053 COMPREHEN METABOLIC PANEL: CPT | Performed by: PHYSICIAN ASSISTANT

## 2024-11-11 PROCEDURE — 3078F DIAST BP <80 MM HG: CPT | Mod: CPTII,S$GLB,, | Performed by: PHYSICIAN ASSISTANT

## 2024-11-11 PROCEDURE — 99214 OFFICE O/P EST MOD 30 MIN: CPT | Mod: S$GLB,,, | Performed by: PHYSICIAN ASSISTANT

## 2024-11-11 PROCEDURE — 3008F BODY MASS INDEX DOCD: CPT | Mod: CPTII,S$GLB,, | Performed by: PHYSICIAN ASSISTANT

## 2024-11-11 PROCEDURE — 85025 COMPLETE CBC W/AUTO DIFF WBC: CPT | Performed by: PHYSICIAN ASSISTANT

## 2024-11-11 PROCEDURE — 3074F SYST BP LT 130 MM HG: CPT | Mod: CPTII,S$GLB,, | Performed by: PHYSICIAN ASSISTANT

## 2024-11-11 PROCEDURE — 1159F MED LIST DOCD IN RCRD: CPT | Mod: CPTII,S$GLB,, | Performed by: PHYSICIAN ASSISTANT

## 2024-11-11 PROCEDURE — 99999 PR PBB SHADOW E&M-EST. PATIENT-LVL IV: CPT | Mod: PBBFAC,,, | Performed by: PHYSICIAN ASSISTANT

## 2024-11-11 PROCEDURE — 70491 CT SOFT TISSUE NECK W/DYE: CPT | Mod: 26,,, | Performed by: RADIOLOGY

## 2024-11-11 PROCEDURE — 36415 COLL VENOUS BLD VENIPUNCTURE: CPT | Mod: PO | Performed by: PHYSICIAN ASSISTANT

## 2024-11-11 RX ADMIN — IOHEXOL 75 ML: 350 INJECTION, SOLUTION INTRAVENOUS at 03:11

## 2024-11-11 NOTE — PROGRESS NOTES
Assessment/Plan:    Problem List Items Addressed This Visit    None  Visit Diagnoses       Localized enlarged lymph nodes    -  Primary    Relevant Orders    CBC Auto Differential    Comprehensive Metabolic Panel    CT Soft Tissue Neck With Contrast        -persistent lymph node swelling >1 month  -recent US unremarkable  -will obtain CT soft tissue neck   -also checking labs today  -ER precautions for severe or worsening of symptoms    Follow up if symptoms worsen or fail to improve.    Delores Porter PA-C  _____________________________________________________________________________________________________________________________________________________    CC: persistent lymph node swelling    HPI: Patient is in clinic today as an established patient here for persistent lymph node swelling on the left side of the neck.    LYMPHADENOPATHY:  She reports lymph node swelling on the left side of her neck for approximately one month, with no increase in size since initial observation. The swelling is described as painless. She denies feeling any lymph nodes on the right side of her neck. Previous ultrasound results revealed lymph nodes on both sides, all less than 1 cm in size, described as nonspecific with possibilities including reactive nodes. She expresses ongoing concern despite reassuring imaging results.    CURRENT SYMPTOMS:  She denies experiencing fever, sore throat, or any other symptoms of illness. She reports feeling great overall.    FAMILY HISTORY:  Her sister has multiple food allergies and her father has celiac disease. She has been tested for celiac disease multiple times.    PATIENT CONCERNS:  She wonders about possible food allergies contributing to her symptoms. She denies being particularly concerned about cancer following the ultrasound results.    No other complaints today.     Past Medical History:   Diagnosis Date    Anxiety     Delivery of pregnancy by  section     Depression      Digestive disorder     diarrhea    Gestational diabetes     Pre-eclampsia in third trimester 2023    Unspecified pre-eclampsia, unspecified trimester      Past Surgical History:   Procedure Laterality Date     SECTION  23    COLONOSCOPY N/A 2021    Procedure: COLONOSCOPY;  Surgeon: Gayle Mcneill MD;  Location: [facility];  Service: Endoscopy;  Laterality: N/A;    ESOPHAGOGASTRODUODENOSCOPY N/A 2021    Procedure: EGD (ESOPHAGOGASTRODUODENOSCOPY);  Surgeon: Gayle Mcneill MD;  Location: [facility];  Service: Endoscopy;  Laterality: N/A;    WISDOM TOOTH EXTRACTION       Review of patient's allergies indicates:   Allergen Reactions    Corticosteroids (glucocorticoids) Palpitations     Oral steroid     Social History     Tobacco Use    Smoking status: Never    Smokeless tobacco: Never   Substance Use Topics    Alcohol use: Not Currently    Drug use: Never     Family History   Problem Relation Name Age of Onset    Alcohol abuse Mother Maeve Pierre     Depression Mother Maeve Pierre     Drug abuse Mother Maeve Pierre     Diabetes Father Derrick Adams         Prediabetic    Hearing loss Father Derrick Adams     Hyperlipidemia Father Derrick Adams     Miscarriages / Stillbirths Sister Arnaldo Quiroz     Cancer Paternal Grandfather JM         Colorectal cancer    Hearing loss Paternal Grandfather JM     Stroke Paternal Grandfather JM     Diabetes Maternal Aunt Great Aunt Marcie     Miscarriages / Stillbirths Maternal Aunt Great Aunt Marcie         Cannot have children     Current Outpatient Medications on File Prior to Visit   Medication Sig Dispense Refill    UNABLE TO FIND MAGNESIUM AND ASHWAGHANDA      UNABLE TO FIND BERBERCOL      MULTIVITAMIN ORAL Take by mouth.      omega-3/dha/epa/dpa/fish oil (OMEGA-3 2100 ORAL)        No current facility-administered medications on file prior to visit.       Review of Systems   Constitutional:  Negative for activity change, chills, diaphoresis,  "fatigue, fever and unexpected weight change.   HENT:  Negative for congestion, ear pain, hearing loss, postnasal drip, rhinorrhea, sinus pain, sore throat and trouble swallowing.    Eyes:  Negative for pain, discharge, redness and visual disturbance.   Respiratory:  Negative for cough, chest tightness, shortness of breath and wheezing.    Cardiovascular:  Negative for chest pain, palpitations and leg swelling.   Gastrointestinal:  Negative for abdominal pain, blood in stool, constipation, diarrhea, nausea and vomiting.   Endocrine: Negative for polydipsia and polyuria.   Genitourinary:  Negative for difficulty urinating, dysuria, hematuria and menstrual problem.   Musculoskeletal:  Negative for arthralgias, joint swelling and neck pain.   Skin:  Negative for rash.   Neurological:  Negative for dizziness, syncope, weakness and headaches.   Psychiatric/Behavioral:  Negative for confusion and dysphoric mood. The patient is not nervous/anxious.        Vitals:    11/11/24 1035   BP: 125/78   Pulse: 98   Resp: 18   Weight: 55.8 kg (123 lb 1.6 oz)   Height: 5' 2" (1.575 m)       Wt Readings from Last 3 Encounters:   11/11/24 55.8 kg (123 lb 1.6 oz)   10/22/24 54.4 kg (120 lb)   10/15/24 56.6 kg (124 lb 11.2 oz)       Physical Exam  Constitutional:       General: She is not in acute distress.     Appearance: Normal appearance. She is well-developed.   HENT:      Head: Normocephalic and atraumatic.   Eyes:      Conjunctiva/sclera: Conjunctivae normal.   Cardiovascular:      Rate and Rhythm: Normal rate and regular rhythm.      Pulses: Normal pulses.      Heart sounds: Normal heart sounds. No murmur heard.  Pulmonary:      Effort: Pulmonary effort is normal. No respiratory distress.      Breath sounds: Normal breath sounds.   Abdominal:      General: Bowel sounds are normal. There is no distension.      Palpations: Abdomen is soft.      Tenderness: There is no abdominal tenderness.   Musculoskeletal:         General: Normal " range of motion.      Cervical back: Normal range of motion and neck supple.   Lymphadenopathy:      Cervical: Cervical adenopathy (L side) present.   Skin:     General: Skin is warm and dry.      Findings: No rash.   Neurological:      General: No focal deficit present.      Mental Status: She is alert and oriented to person, place, and time.   Psychiatric:         Mood and Affect: Mood normal.         Behavior: Behavior normal.         Health Maintenance   Topic Date Due    TETANUS VACCINE  01/03/2033    Hepatitis C Screening  Completed    Lipid Panel  Completed     DISCLAIMER: This note was compiled by using a speech recognition dictation system and therefore please be aware that typographical / speech recognition errors can and do occur.  Please contact me if you see any errors specifically.  Consent was obtained for [employer] recording system prior to the visit.

## 2024-11-12 ENCOUNTER — TELEPHONE (OUTPATIENT)
Dept: FAMILY MEDICINE | Facility: CLINIC | Age: 34
End: 2024-11-12
Payer: COMMERCIAL

## 2024-11-12 DIAGNOSIS — R22.1 LOCALIZED SWELLING, MASS AND LUMP, NECK: Primary | ICD-10-CM

## 2024-11-12 NOTE — PROGRESS NOTES
Results have been released via [employer]. Please verify that these have been viewed by patient. If not, please call patient with results.     I have sent a message to them with the following interpretation (see below).    I have reviewed your recent CT neck which showed no significant mass or swollen lymph nodes. We can consider a follow up with ENT if symptoms persist, however, based off labs and imaging I do not think this is anything of concern.      Please do not hesitate to call or message with any additional questions or concerns.    Delores Porter PA-C

## 2024-11-12 NOTE — TELEPHONE ENCOUNTER
Pt aware of test results and recommendations per parrish Estrella . Pt verbalized understanding   Pt agrees to see ent

## 2024-11-12 NOTE — TELEPHONE ENCOUNTER
----- Message from Delores Porter PA-C sent at 11/12/2024  8:06 AM CST -----  Results have been released via [employer]. Please verify that these have been viewed by patient. If not, please call patient with results.     I have sent a message to them with the following interpretation (see below).    I have reviewed your recent CT neck which showed no significant mass or swollen lymph nodes. We can consider a follow up with ENT if symptoms persist, however, based off labs and imaging I do not think this is anything of concern.      Please do not hesitate to call or message with any additional questions or concerns.    Delores Porter PA-C

## 2024-11-12 NOTE — PROGRESS NOTES
Results have been released via [employer]. Please verify that these have been viewed by patient. If not, please call patient with results.     I have sent a message to them with the following interpretation (see below).    I have reviewed your recent blood work.     CBC NORMAL-The CBC appears to be stable at this time with no sign of major anemia, abnormal white count or platelet abnormality.  CMP/BMP NORMAL-The electrolytes all appear stable at this time. This includes kidney functions along with routine electrolytes like sugar, potassium and sodium. The liver enzymes were noted to be stable also.    Please do not hesitate to call or message with any additional questions or concerns.    Delores Porter PA-C

## 2024-11-12 NOTE — TELEPHONE ENCOUNTER
I have signed for the following orders AND/OR meds.  Please call the patient and ask the patient to schedule the testing AND/OR inform about any medications that were sent. Medications have been sent to pharmacy listed below      Orders Placed This Encounter   Procedures    Ambulatory referral/consult to ENT     Standing Status:   Future     Standing Expiration Date:   12/12/2025     Referral Priority:   Routine     Referral Type:   Consultation     Referral Reason:   Specialty Services Required     Referred to Provider:   Roxana Mccollum MD     Requested Specialty:   Otolaryngology     Number of Visits Requested:   1              [facility] - [facility] [address]  Phone: [phone] Fax: [phone]

## 2025-01-16 ENCOUNTER — OFFICE VISIT (OUTPATIENT)
Dept: FAMILY MEDICINE | Facility: CLINIC | Age: 35
End: 2025-01-16
Payer: COMMERCIAL

## 2025-01-16 ENCOUNTER — HOSPITAL ENCOUNTER (OUTPATIENT)
Dept: RADIOLOGY | Facility: HOSPITAL | Age: 35
Discharge: HOME | End: 2025-01-16
Attending: PHYSICIAN ASSISTANT
Payer: COMMERCIAL

## 2025-01-16 VITALS
HEIGHT: 62 IN | DIASTOLIC BLOOD PRESSURE: 77 MMHG | TEMPERATURE: 98 F | BODY MASS INDEX: 22.63 KG/M2 | WEIGHT: 123 LBS | HEART RATE: 100 BPM | SYSTOLIC BLOOD PRESSURE: 117 MMHG

## 2025-01-16 DIAGNOSIS — M25.512 ACUTE PAIN OF LEFT SHOULDER: ICD-10-CM

## 2025-01-16 DIAGNOSIS — M25.512 ACUTE PAIN OF LEFT SHOULDER: Primary | ICD-10-CM

## 2025-01-16 PROCEDURE — 99999 PR PBB SHADOW E&M-EST. PATIENT-LVL IV: CPT | Mod: PBBFAC,,, | Performed by: PHYSICIAN ASSISTANT

## 2025-01-16 PROCEDURE — 3078F DIAST BP <80 MM HG: CPT | Mod: CPTII,S$GLB,, | Performed by: PHYSICIAN ASSISTANT

## 2025-01-16 PROCEDURE — 99213 OFFICE O/P EST LOW 20 MIN: CPT | Mod: S$GLB,,, | Performed by: PHYSICIAN ASSISTANT

## 2025-01-16 PROCEDURE — 1160F RVW MEDS BY RX/DR IN RCRD: CPT | Mod: CPTII,S$GLB,, | Performed by: PHYSICIAN ASSISTANT

## 2025-01-16 PROCEDURE — 73030 X-RAY EXAM OF SHOULDER: CPT | Mod: TC,PO,LT

## 2025-01-16 PROCEDURE — 3074F SYST BP LT 130 MM HG: CPT | Mod: CPTII,S$GLB,, | Performed by: PHYSICIAN ASSISTANT

## 2025-01-16 PROCEDURE — 73030 X-RAY EXAM OF SHOULDER: CPT | Mod: 26,LT,, | Performed by: RADIOLOGY

## 2025-01-16 PROCEDURE — 3008F BODY MASS INDEX DOCD: CPT | Mod: CPTII,S$GLB,, | Performed by: PHYSICIAN ASSISTANT

## 2025-01-16 PROCEDURE — 1159F MED LIST DOCD IN RCRD: CPT | Mod: CPTII,S$GLB,, | Performed by: PHYSICIAN ASSISTANT

## 2025-01-16 NOTE — PROGRESS NOTES
Results have been released via [employer]. Please verify that these have been viewed by patient. If not, please call patient with results.     I have sent a message to them with the following interpretation (see below).    I have reviewed your recent L shoulder XR which showed no acute findings or significant abnormalities. Please follow up with ortho as discussed at your visit.      Please do not hesitate to call or message with any additional questions or concerns.    Delores Porter PA-C

## 2025-01-16 NOTE — PROGRESS NOTES
Assessment/Plan:    1. Acute pain of left shoulder  -     X-Ray Shoulder 2 or More Views Left; Future; Expected date: 2025  -     Ambulatory referral/consult to Orthopedics; Future; Expected date: 2025    -L shoulder pain >1 month  -no alarm symptoms or signs present  -will obtain a L shoulder XR today  -continue conservative treatment, rest, ice/heat applications, PRN anti-inflammatory medication  -referring to ortho for further evaluation and treatment   -ER precautions for severe or worsening of symptoms    Follow up if symptoms worsen or fail to improve.    Delores Porter PA-C  _____________________________________________________________________________________________________________________________________________________    CC: left shoulder pain    HPI: Patient is in clinic today as an established patient here for left shoulder pain.     HISTORY OF PRESENT ILLNESS:  She presents with left anterior shoulder pain that radiates down to the upper arm and collarbone. Pain is described as burning and tingling in nature. Symptoms began approximately 4 weeks ago with worsening over the past 2 weeks. She has been lifting heavy weights at the gym and suspects that she may have injured herself. Pain is particularly bothersome when lying down, and she has difficulty lying on the affected side. Current management includes ice and rest.     MEDICAL HISTORY:  She has a history of hip bursitis. She denies any prior history of shoulder issues.    No other complaints today.     Past Medical History:   Diagnosis Date    Anxiety     Delivery of pregnancy by  section     Depression     Digestive disorder     diarrhea    Gestational diabetes     Pre-eclampsia in third trimester 2023    Unspecified pre-eclampsia, unspecified trimester      Past Surgical History:   Procedure Laterality Date     SECTION  23    COLONOSCOPY N/A 2021    Procedure: COLONOSCOPY;  Surgeon: Gayle Mcneill,  MD;  Location: [facility] ENDO;  Service: Endoscopy;  Laterality: N/A;    ESOPHAGOGASTRODUODENOSCOPY N/A 07/16/2021    Procedure: EGD (ESOPHAGOGASTRODUODENOSCOPY);  Surgeon: Gayle Mcneill MD;  Location: [facility];  Service: Endoscopy;  Laterality: N/A;    WISDOM TOOTH EXTRACTION       Review of patient's allergies indicates:   Allergen Reactions    Corticosteroids (glucocorticoids) Palpitations     Oral steroid     Social History     Tobacco Use    Smoking status: Never    Smokeless tobacco: Never   Substance Use Topics    Alcohol use: Not Currently    Drug use: Never     Family History   Problem Relation Name Age of Onset    Alcohol abuse Mother Maeve Pierre     Depression Mother Maeve Pierre     Drug abuse Mother Maeve Pierre     Diabetes Father Derrick Adams         Prediabetic    Hearing loss Father Derrick Adams     Hyperlipidemia Father Derrick Adams     Miscarriages / Stillbirths Sister Arnaldo Quiroz     Cancer Paternal Grandfather JM         Colorectal cancer    Hearing loss Paternal Grandfather JM     Stroke Paternal Grandfather JM     Diabetes Maternal Aunt Great Aunt Marcie     Miscarriages / Stillbirths Maternal Aunt Great Aunt Marcie         Cannot have children     Current Outpatient Medications on File Prior to Visit   Medication Sig Dispense Refill    MULTIVITAMIN ORAL Take by mouth.      omega-3/dha/epa/dpa/fish oil (OMEGA-3 2100 ORAL)       UNABLE TO FIND MAGNESIUM AND ASHJUJUANDA      UNABLE TO FIND BERBERCOL (Patient not taking: Reported on 1/16/2025)       No current facility-administered medications on file prior to visit.       Review of Systems   Constitutional:  Positive for activity change. Negative for chills, diaphoresis, fatigue, fever and unexpected weight change.   HENT:  Negative for congestion, ear pain, hearing loss, postnasal drip, rhinorrhea, sinus pain, sore throat and trouble swallowing.    Eyes:  Negative for pain, discharge, redness and visual disturbance.   Respiratory:   "Negative for cough, chest tightness, shortness of breath and wheezing.    Cardiovascular:  Negative for chest pain, palpitations and leg swelling.   Gastrointestinal:  Negative for abdominal pain, blood in stool, constipation, diarrhea, nausea and vomiting.   Endocrine: Negative for polydipsia and polyuria.   Genitourinary:  Negative for difficulty urinating, dysuria, hematuria and menstrual problem.   Musculoskeletal:  Positive for arthralgias. Negative for joint swelling and neck pain.   Skin:  Negative for rash.   Neurological:  Negative for dizziness, syncope, weakness and headaches.   Psychiatric/Behavioral:  Negative for confusion and dysphoric mood. The patient is not nervous/anxious.        Vitals:    01/16/25 0742   BP: 117/77   Patient Position: Sitting   Pulse: 100   Temp: 98.1 °F (36.7 °C)   Weight: 55.8 kg (123 lb)   Height: 5' 2" (1.575 m)       Wt Readings from Last 3 Encounters:   01/16/25 55.8 kg (123 lb)   11/14/24 55.8 kg (123 lb 0.3 oz)   11/11/24 55.8 kg (123 lb 1.6 oz)       Physical Exam  Constitutional:       General: She is not in acute distress.     Appearance: Normal appearance. She is well-developed.   HENT:      Head: Normocephalic and atraumatic.   Eyes:      Conjunctiva/sclera: Conjunctivae normal.   Cardiovascular:      Rate and Rhythm: Normal rate and regular rhythm.      Pulses: Normal pulses.      Heart sounds: Normal heart sounds. No murmur heard.  Pulmonary:      Effort: Pulmonary effort is normal. No respiratory distress.      Breath sounds: Normal breath sounds.   Abdominal:      General: Bowel sounds are normal. There is no distension.      Palpations: Abdomen is soft.      Tenderness: There is no abdominal tenderness.   Musculoskeletal:         General: Normal range of motion.      Left shoulder: Tenderness present. No swelling, deformity or bony tenderness. Normal range of motion.      Cervical back: Normal range of motion and neck supple.   Skin:     General: Skin is warm " and dry.      Findings: No rash.   Neurological:      General: No focal deficit present.      Mental Status: She is alert and oriented to person, place, and time.   Psychiatric:         Mood and Affect: Mood normal.         Behavior: Behavior normal.         Health Maintenance   Topic Date Due    Cervical Cancer Screening  02/28/2029    TETANUS VACCINE  01/03/2033    RSV Vaccine (Age 60+ and Pregnant patients) (1 - 1-dose 75+ series) 07/10/2067    Hepatitis C Screening  Completed    Influenza Vaccine  Completed    HIV Screening  Completed    COVID-19 Vaccine  Completed    Lipid Panel  Completed    Pneumococcal Vaccines (Age 0-49)  Aged Out     DISCLAIMER: This note was compiled by using a speech recognition dictation system and therefore please be aware that typographical / speech recognition errors can and do occur.  Please contact me if you see any errors specifically.  Consent was obtained for [employer] recording system prior to the visit.

## 2025-01-27 ENCOUNTER — OFFICE VISIT (OUTPATIENT)
Dept: ORTHOPEDICS | Facility: CLINIC | Age: 35
End: 2025-01-27
Payer: COMMERCIAL

## 2025-01-27 VITALS — BODY MASS INDEX: 22.63 KG/M2 | WEIGHT: 123 LBS | HEIGHT: 62 IN

## 2025-01-27 DIAGNOSIS — M25.512 ACUTE PAIN OF LEFT SHOULDER: ICD-10-CM

## 2025-01-27 DIAGNOSIS — M67.912 TENDINOPATHY OF LEFT ROTATOR CUFF: Primary | ICD-10-CM

## 2025-01-27 DIAGNOSIS — M75.52 SUBACROMIAL BURSITIS OF LEFT SHOULDER JOINT: ICD-10-CM

## 2025-01-27 DIAGNOSIS — G89.29 CHRONIC LEFT SHOULDER PAIN: ICD-10-CM

## 2025-01-27 DIAGNOSIS — M25.512 CHRONIC LEFT SHOULDER PAIN: ICD-10-CM

## 2025-01-27 PROCEDURE — 99999 PR PBB SHADOW E&M-EST. PATIENT-LVL IV: CPT | Mod: PBBFAC,,, | Performed by: STUDENT IN AN ORGANIZED HEALTH CARE EDUCATION/TRAINING PROGRAM

## 2025-01-27 PROCEDURE — 99204 OFFICE O/P NEW MOD 45 MIN: CPT | Mod: S$GLB,,, | Performed by: STUDENT IN AN ORGANIZED HEALTH CARE EDUCATION/TRAINING PROGRAM

## 2025-01-27 PROCEDURE — 1159F MED LIST DOCD IN RCRD: CPT | Mod: CPTII,S$GLB,, | Performed by: STUDENT IN AN ORGANIZED HEALTH CARE EDUCATION/TRAINING PROGRAM

## 2025-01-27 PROCEDURE — 3008F BODY MASS INDEX DOCD: CPT | Mod: CPTII,S$GLB,, | Performed by: STUDENT IN AN ORGANIZED HEALTH CARE EDUCATION/TRAINING PROGRAM

## 2025-01-27 PROCEDURE — 1160F RVW MEDS BY RX/DR IN RCRD: CPT | Mod: CPTII,S$GLB,, | Performed by: STUDENT IN AN ORGANIZED HEALTH CARE EDUCATION/TRAINING PROGRAM

## 2025-01-27 NOTE — PATIENT INSTRUCTIONS
Assessment:  Violette Esposito is a 34 y.o. female   Chief Complaint   Patient presents with    Left Shoulder - Pain       Encounter Diagnoses   Name Primary?    Acute pain of left shoulder     Chronic left shoulder pain Yes        Plan:  Referral to physical therapy at [facility] in [place]  An order for Physical Therapy within the [facility] system was placed today.  Please expect a call from our Centralized Scheduling number, [phone].  If you do not hear from them in the next few days, please call our local PT department directly at [phone].  Apply topical diclofenac (Voltaren) up to 4 times a day to the affected area.  It can be bought over the counter at any local pharmacy.    Patient may use over the counter lidocaine patches as needed for pain.  Patient may use ice and heat as needed for pain every 2 hours for 15 minutes  Follow up as needed          Follow-up: as needed.    Thank you for choosing [facility] and Dr. Bruce Rachel for your orthopedic & sports medicine care. It is our goal to provide you with exceptional care that will help keep you healthy, active, and get you back in the game.    Please do not hesitate to reach out to us via email, phone, or MyChart with any questions, concerns, or feedback.    If you felt that you received exemplary care today, please consider leaving us feedback on [employer] at:  [email or web address]    If you are experiencing pain/discomfort ,or have questions after 5pm and would like to be connected to the [facility] on-call team, please call this number and specify which Sports Medicine provider is treating you: [phone]

## 2025-01-27 NOTE — PROGRESS NOTES
Patient ID: Violette Esposito  YOB: 1990  MRN: [id number]    Chief Complaint: Pain of the Left Shoulder      Referred By: Delores Porter PA-C for Left Shoulder Pain    History of Present Illness: Violette Esposito is a right-hand dominant 34 y.o. female who presents today with left shoulder pain.  Pain developed approximately 6 weeks ago after increasing her weight while at the gym.  Rates the pain as a constant 1 /10 but will increase to a 4/10 with certain movements.  Pain located at the anterior shoulder and lateral border of scapula.  Has noticed popping in the shoulder, that is sometimes painful, with certain movements.  Patient has tried ice, heat and rest for symptom relief.  Allergic to oral corticosteroids. Has not tried any formal physical therapy at this time but continues to attend exercise classes and modifies her activities.    The patient is active in none.  Occupation: Clerical Work      Past Medical History:   Past Medical History:   Diagnosis Date    Anxiety     Delivery of pregnancy by  section     Depression     Digestive disorder     diarrhea    Gestational diabetes     Pre-eclampsia in third trimester 2023    Unspecified pre-eclampsia, unspecified trimester      Past Surgical History:   Procedure Laterality Date     SECTION  23    COLONOSCOPY N/A 2021    Procedure: COLONOSCOPY;  Surgeon: Gayle Mcneill MD;  Location: [facility];  Service: Endoscopy;  Laterality: N/A;    ESOPHAGOGASTRODUODENOSCOPY N/A 2021    Procedure: EGD (ESOPHAGOGASTRODUODENOSCOPY);  Surgeon: Gayle Mcneill MD;  Location: [facility];  Service: Endoscopy;  Laterality: N/A;    WISDOM TOOTH EXTRACTION       Family History   Problem Relation Name Age of Onset    Alcohol abuse Mother Maeve Pierre     Depression Mother Maeve Pierre     Drug abuse Mother Maeve Pierre     Diabetes Father Derrick Adams         Prediabetic     Hearing loss Father Derrick Adams     Hyperlipidemia Father Derrick Adams     Miscarriages / Stillbirths Sister Arnaldo Quiroz     Cancer Paternal Grandfather JM         Colorectal cancer    Hearing loss Paternal Grandfather JM     Stroke Paternal Grandfather JM     Diabetes Maternal Aunt Great Aunt Marcie     Miscarriages / Stillbirths Maternal Aunt Great Aunt Marcie         Cannot have children     Social History     Socioeconomic History    Marital status:    Tobacco Use    Smoking status: Never    Smokeless tobacco: Never   Substance and Sexual Activity    Alcohol use: Not Currently    Drug use: Never    Sexual activity: Yes     Partners: Female     Birth control/protection: Condom, Other-see comments     Comment: Breastfeeding, cycle tracking     Social Drivers of Health     Financial Resource Strain: Low Risk  (1/13/2025)    Overall Financial Resource Strain (CARDIA)     Difficulty of Paying Living Expenses: Not hard at all   Food Insecurity: No Food Insecurity (1/13/2025)    Hunger Vital Sign     Worried About Running Out of Food in the Last Year: Never true     Ran Out of Food in the Last Year: Never true   Transportation Needs: No Transportation Needs (12/20/2023)    PRAPARE - Transportation     Lack of Transportation (Medical): No     Lack of Transportation (Non-Medical): No   Physical Activity: Sufficiently Active (1/13/2025)    Exercise Vital Sign     Days of Exercise per Week: 4 days     Minutes of Exercise per Session: 50 min   Stress: No Stress Concern Present (1/13/2025)    [employer] [place] [employer] - Occupational Stress Questionnaire     Feeling of Stress : Not at all   Housing Stability: Low Risk  (12/20/2023)    Housing Stability Vital Sign     Unable to Pay for Housing in the Last Year: No     Number of Places Lived in the Last Year: 1     Unstable Housing in the Last Year: No     Medication List with Changes/Refills   Current Medications    MULTIVITAMIN ORAL    Take by mouth.     OMEGA-3/DHA/EPA/DPA/FISH OIL (OMEGA-3 2100 ORAL)        UNABLE TO FIND    MAGNESIUM AND REA    UNABLE TO FIND    BERBERCOL     Review of patient's allergies indicates:   Allergen Reactions    Corticosteroids (glucocorticoids) Palpitations     Oral steroid       Physical Exam:   Body mass index is 22.5 kg/m².    GENERAL: Well appearing, in no acute distress.  HEAD: Normocephalic and atraumatic.  ENT: External ears and nose grossly normal.  EYES: EOMI bilaterally  PULMONARY: Respirations are grossly even and non-labored.  NEURO: Awake, alert, and oriented x 3.  SKIN: No obvious rashes appreciated.  PSYCH: Mood & affect are appropriate.    Detailed MSK exam:     Left shoulder exam:   -ROM: abduction 130, forward flexion 130, external rotation 90, internal rotation 70  -empty can test negative, resisted ER pain but no weakness, belly press negative  -park test negative, neers test negative, whipple test positive  -biceps load test negative, yerguson test negative, LaSalle's test negative  -sensation intact, pulses 2+  -TTP: lateral cuff insertion    Right shoulder exam:   -ROM: abduction 130, forward flexion 130, external rotation 90, internal rotation 70  -empty can test negative, resisted ER negative, belly press negative  -park test negative, neers test negative, whipple test negative  -biceps load test negative, yerguson test negative, LaSalle's test negative  -sensation intact, pulses 2+  -TTP: none      Imaging:  X-Ray Shoulder 2 or More Views Left  Narrative: EXAM:    XR SHOULDER COMPLETE 2 OR MORE VIEWS LEFT    CLINICAL HISTORY: [M25.512]-Pain in left shoulder.    COMPARISON: None    FINDINGS:  This examination consists of 4 views of the left shoulder.  There is no fracture.  There is no dislocation.  Impression:  Normal Study    Finalized on: 1/16/2025 11:32 AM By:  Derrick Bell MD  BRRG# [id number]      2025-01-16 11:34:[id number]    BRDANNIG        Relevant imaging results were reviewed and interpreted  by me and per my read shows no acute abnormalities.  This was discussed with the patient and / or family today.     Assessment:  Violette Esposito is a 34 y.o. female presenting with left shoulder pain 6 weeks.   History, physical and radiographs are consistent with a likely diagnosis of RC tendinopathy, SA bursitis.   Plan: PT referral. Ice/heat, voltaren gel, lidocaine patches as needed. Consider steroid injection if not improving. Continue conservative management for pain.   Follow up as needed. All questions answered.      Tendinopathy of left rotator cuff    Acute pain of left shoulder  -     Ambulatory referral/consult to Orthopedics    Chronic left shoulder pain  -     Ambulatory Referral/Consult to Physical Therapy/Occupational Therapy; Future; Expected date: 02/03/2025    Subacromial bursitis of left shoulder joint             A copy of today's visit note has been sent to the referring provider.     Electronically signed:  Bruce Rachel MD, MPH  01/27/2025  8:23 AM    Answers submitted by the patient for this visit:  Orthopedics Questionnaire (Submitted on 1/26/2025)  unexpected weight change: No  appetite change : No  sleep disturbance: No  IMMUNOCOMPROMISED: No  nervous/ anxious: No  dysphoric mood: No  rash: No  visual disturbance: No  eye redness: No  eye pain: No  ear pain: No  tinnitus: No  hearing loss: No  sinus pressure : No  nosebleeds: No  enviro allergies: Yes  food allergies: No  cough: No  shortness of breath: No  sweating: No  dysuria: No  frequency: No  difficulty urinating: No  hematuria: No  painful intercourse: No  chest pain: No  palpitations: No  nausea: No  vomiting: No  diarrhea: No  blood in stool: No  constipation: No  headaches: No  dizziness: No  numbness: No  seizures: No  joint swelling: No  myalgia: No  weakness: No  back pain: No   (Submitted on 1/26/2025)  Chief Complaint: Arm pain  Pain Chronicity: new  History of trauma: No  Onset: more than 1 month  ago  Frequency: daily  Progression since onset: gradually worsening  Injury mechanism: lifting  injury location: weight lifting  pain- numeric: 4/10  pain location: left shoulder  pain quality: aching, tightness, burning, tingling  Radiating Pain: Yes  If your pain is radiating, to what part of the body?: left shoulder  Aggravating factors: activity, exercise, lifting  fever: No  inability to bear weight: Yes  itching: No  joint locking: No  limited range of motion: Yes  stiffness: Yes  tingling: Yes  Treatments tried: cold, heat, exercise, movement, rest  physical therapy: ineffective  Improvement on treatment: mild

## 2025-01-28 ENCOUNTER — LAB VISIT (OUTPATIENT)
Dept: LAB | Facility: HOSPITAL | Age: 35
End: 2025-01-28
Attending: INTERNAL MEDICINE
Payer: COMMERCIAL

## 2025-01-28 ENCOUNTER — OFFICE VISIT (OUTPATIENT)
Dept: CARDIOLOGY | Facility: CLINIC | Age: 35
End: 2025-01-28
Payer: COMMERCIAL

## 2025-01-28 VITALS
HEART RATE: 76 BPM | SYSTOLIC BLOOD PRESSURE: 110 MMHG | WEIGHT: 125.19 LBS | DIASTOLIC BLOOD PRESSURE: 74 MMHG | HEIGHT: 62 IN | OXYGEN SATURATION: 98 % | BODY MASS INDEX: 23.04 KG/M2

## 2025-01-28 DIAGNOSIS — E78.00 PURE HYPERCHOLESTEROLEMIA: ICD-10-CM

## 2025-01-28 DIAGNOSIS — E78.00 PURE HYPERCHOLESTEROLEMIA: Primary | ICD-10-CM

## 2025-01-28 LAB
CHOLEST SERPL-MCNC: 191 MG/DL (ref 120–199)
CHOLEST/HDLC SERPL: 3.5 {RATIO} (ref 2–5)
HDLC SERPL-MCNC: 54 MG/DL (ref 40–75)
HDLC SERPL: 28.3 % (ref 20–50)
LDLC SERPL CALC-MCNC: 124 MG/DL (ref 63–159)
NONHDLC SERPL-MCNC: 137 MG/DL
TRIGL SERPL-MCNC: 65 MG/DL (ref 30–150)

## 2025-01-28 PROCEDURE — 1160F RVW MEDS BY RX/DR IN RCRD: CPT | Mod: CPTII,S$GLB,, | Performed by: INTERNAL MEDICINE

## 2025-01-28 PROCEDURE — 36415 COLL VENOUS BLD VENIPUNCTURE: CPT | Mod: PO | Performed by: INTERNAL MEDICINE

## 2025-01-28 PROCEDURE — 80061 LIPID PANEL: CPT | Performed by: INTERNAL MEDICINE

## 2025-01-28 PROCEDURE — 3074F SYST BP LT 130 MM HG: CPT | Mod: CPTII,S$GLB,, | Performed by: INTERNAL MEDICINE

## 2025-01-28 PROCEDURE — 1159F MED LIST DOCD IN RCRD: CPT | Mod: CPTII,S$GLB,, | Performed by: INTERNAL MEDICINE

## 2025-01-28 PROCEDURE — 3008F BODY MASS INDEX DOCD: CPT | Mod: CPTII,S$GLB,, | Performed by: INTERNAL MEDICINE

## 2025-01-28 PROCEDURE — 99999 PR PBB SHADOW E&M-EST. PATIENT-LVL III: CPT | Mod: PBBFAC,,, | Performed by: INTERNAL MEDICINE

## 2025-01-28 PROCEDURE — 3078F DIAST BP <80 MM HG: CPT | Mod: CPTII,S$GLB,, | Performed by: INTERNAL MEDICINE

## 2025-01-28 PROCEDURE — 99213 OFFICE O/P EST LOW 20 MIN: CPT | Mod: S$GLB,,, | Performed by: INTERNAL MEDICINE

## 2025-01-28 NOTE — PROGRESS NOTES
Subjective   Patient ID:  Violette Esposito is a 34 y.o. female who presents for follow-up of No chief complaint on file.      HPI34 yo WF with HLD managed with diet alone. No early heart disease in immediate family. Denies chest pain, SOB, or edema     Review of Systems   Constitutional: Negative for decreased appetite, fever, malaise/fatigue, weight gain and weight loss.   HENT:  Negative for hearing loss and nosebleeds.    Eyes:  Negative for visual disturbance.   Cardiovascular:  Negative for chest pain, claudication, cyanosis, dyspnea on exertion, irregular heartbeat, leg swelling, near-syncope, orthopnea, palpitations, paroxysmal nocturnal dyspnea and syncope.   Respiratory:  Negative for cough, hemoptysis, shortness of breath, sleep disturbances due to breathing, snoring and wheezing.    Endocrine: Negative for cold intolerance, heat intolerance, polydipsia and polyuria.   Hematologic/Lymphatic: Negative for adenopathy and bleeding problem. Does not bruise/bleed easily.   Skin:  Negative for color change, itching, poor wound healing, rash and suspicious lesions.   Musculoskeletal:  Negative for arthritis, back pain, falls, joint pain, joint swelling, muscle cramps, muscle weakness and myalgias.   Gastrointestinal:  Negative for bloating, abdominal pain, change in bowel habit, constipation, flatus, heartburn, hematemesis, hematochezia, hemorrhoids, jaundice, melena, nausea and vomiting.   Genitourinary:  Negative for bladder incontinence, decreased libido, frequency, hematuria, hesitancy and urgency.   Neurological:  Negative for brief paralysis, difficulty with concentration, excessive daytime sleepiness, dizziness, focal weakness, headaches, light-headedness, loss of balance, numbness, vertigo and weakness.   Psychiatric/Behavioral:  Negative for altered mental status, depression and memory loss. The patient does not have insomnia and is not nervous/anxious.    Allergic/Immunologic: Negative  "for environmental allergies, hives and persistent infections.          Objective     Physical Exam  Vitals and nursing note reviewed.   Constitutional:       Appearance: She is well-developed.      Comments: /74 (BP Location: Right arm, Patient Position: Sitting)   Pulse 76   Ht 5' 2" (1.575 m)   Wt 56.8 kg (125 lb 3.2 oz)   LMP 01/16/2025 (Exact Date)   SpO2 98%   BMI 22.90 kg/m²      HENT:      Head: Normocephalic and atraumatic.      Right Ear: External ear normal.      Left Ear: External ear normal.      Nose: Nose normal.   Eyes:      General: Lids are normal. No scleral icterus.        Right eye: No discharge.         Left eye: No discharge.      Conjunctiva/sclera: Conjunctivae normal.      Right eye: No hemorrhage.     Pupils: Pupils are equal, round, and reactive to light.   Neck:      Thyroid: No thyromegaly.      Vascular: No JVD.      Trachea: No tracheal deviation.   Cardiovascular:      Rate and Rhythm: Normal rate and regular rhythm.      Pulses: Intact distal pulses.      Heart sounds: Normal heart sounds. No murmur heard.     No friction rub. No gallop.   Pulmonary:      Effort: Pulmonary effort is normal. No respiratory distress.      Breath sounds: Normal breath sounds. No wheezing or rales.   Chest:      Chest wall: No tenderness.   Breasts:     Breasts are symmetrical.   Abdominal:      General: Bowel sounds are normal. There is no distension.      Palpations: Abdomen is soft. There is no hepatomegaly or mass.      Tenderness: There is no abdominal tenderness. There is no guarding or rebound.   Musculoskeletal:         General: No tenderness. Normal range of motion.      Cervical back: Normal range of motion and neck supple.   Lymphadenopathy:      Cervical: No cervical adenopathy.   Skin:     General: Skin is warm and dry.      Coloration: Skin is not pale.      Findings: No erythema or rash.   Neurological:      Mental Status: She is alert and oriented to person, place, and time. "      Cranial Nerves: No cranial nerve deficit.      Coordination: Coordination normal.      Deep Tendon Reflexes: Reflexes are normal and symmetric. Reflexes normal.   Psychiatric:         Behavior: Behavior normal.         Thought Content: Thought content normal.         Judgment: Judgment normal.            Assessment and Plan     1. Pure hypercholesterolemia        Plan:  Continue diet   Patient advised to modify risk factors such as weight, exercise, diet,  tobacco and alcohol exposure      Orders Placed This Encounter   Procedures    Lipid Panel     Follow up in about 1 year (around 1/28/2026).     Advance Care Planning     Date: 01/28/2025

## 2025-01-29 ENCOUNTER — PATIENT MESSAGE (OUTPATIENT)
Dept: CARDIOLOGY | Facility: CLINIC | Age: 35
End: 2025-01-29
Payer: COMMERCIAL

## 2025-02-05 ENCOUNTER — CLINICAL SUPPORT (OUTPATIENT)
Dept: REHABILITATION | Facility: HOSPITAL | Age: 35
End: 2025-02-05
Attending: STUDENT IN AN ORGANIZED HEALTH CARE EDUCATION/TRAINING PROGRAM
Payer: COMMERCIAL

## 2025-02-05 DIAGNOSIS — G89.29 CHRONIC LEFT SHOULDER PAIN: ICD-10-CM

## 2025-02-05 DIAGNOSIS — M25.512 LEFT SHOULDER PAIN, UNSPECIFIED CHRONICITY: Primary | ICD-10-CM

## 2025-02-05 DIAGNOSIS — M25.512 CHRONIC LEFT SHOULDER PAIN: ICD-10-CM

## 2025-02-05 PROCEDURE — 97110 THERAPEUTIC EXERCISES: CPT | Mod: PN | Performed by: PHYSICAL THERAPIST

## 2025-02-05 PROCEDURE — 97112 NEUROMUSCULAR REEDUCATION: CPT | Mod: PN | Performed by: PHYSICAL THERAPIST

## 2025-02-05 PROCEDURE — 97161 PT EVAL LOW COMPLEX 20 MIN: CPT | Mod: PN | Performed by: PHYSICAL THERAPIST

## 2025-02-05 NOTE — PROGRESS NOTES
Outpatient Rehab    Physical Therapy Evaluation    Patient Name: Violette Esposito  MRN: [id number]  YOB: 1990  Today's Date: 2025    Therapy Diagnosis:   Encounter Diagnoses   Name Primary?    Chronic left shoulder pain     Left shoulder pain, unspecified chronicity Yes     Physician: Bruce Rachel MD    Physician Orders: Eval and Treat  Medical Diagnosis:  Chronic left shoulder pain     Visit # / Visits Authorized:     Date of Evaluation:  2025   Insurance Authorization Period: 25 to 25  Plan of Care Certification:  2025 to 25      Time In: 0736   Time Out: 0829  Total Time: 53   Total Billable Time: 25         Subjective   History of Present Illness  Violette is a 34 y.o. female who reports to physical therapy with a chief concern of Left shoulder pain. According to the patient's chart, Violette has a past medical history of Anxiety, Delivery of pregnancy by  section, Depression, Digestive disorder, Gestational diabetes, Pre-eclampsia in third trimester, and Unspecified pre-eclampsia, unspecified trimester. Violette has a past surgical history that includes [place] tooth extraction; Esophagogastroduodenoscopy (N/A, 2021); Colonoscopy (N/A, 2021); and  section (23).    The patient reports a medical diagnosis of Chronic left shoulder pain.    Diagnostic tests related to this condition: X-ray.        Dominant Hand: Right  History of Present Condition/Illness: Violette reports a 6 week history of left shoulder pain with a gradual onset. She states that she started lifting heavier at the gym with pressing activities and noticed an increase in shoulder pain. The pain is intermittent in nature over the last week, but prior to this it was more of a constant pain. She has taken a course of NSAID's and used voltaren with some reduction in pain     Activities of Daily Living  Social history was obtained from Patient.           Patient Roles: Caregiver for child    Previously independent with activities of daily living? Yes                Previously independent with instrumental activities of daily living? Yes     Currently independent with instrumental activities of daily living? No  Activities currently needing assistance include: Health management.   Limiting upper body exercise activities secondary to pain         Pain     Patient reports a current pain level of 1/10. Pain at best is reported as 1/10. Pain at worst is reported as 5/10.   Location: Left anterior shoulder  Clinical Progression (since onset): Improved  Pain Qualities: Aching, Dull, Other (Comment)  Other Pain Qualities: tingling  Pain-Relieving Factors: Medications - over-the-counter, Ice  Pain-Aggravating Factors: Lifting, Other (Comment)  Other Pain-Aggravating Factors: holding her child         Review of Systems      Reviewed and found unremarkable: All Other Systems    Treatment History  Treatments  Previously Received Treatments: No  Currently Receiving Treatments: No    Living Arrangements  Living Situation  Housing: Home independently  Living Arrangements: Family members  Support Systems: Family members        Employment  Patient does not report that: Does the patient's condition impact their ability to work?  Employment Status: Employed full-time   Works a clerical job       Past Medical History/Physical Systems Review:   Violette Esposito  has a past medical history of Anxiety, Delivery of pregnancy by  section, Depression, Digestive disorder, Gestational diabetes, Pre-eclampsia in third trimester, and Unspecified pre-eclampsia, unspecified trimester.    Violette Esposito  has a past surgical history that includes [place] tooth extraction; Esophagogastroduodenoscopy (N/A, 2021); Colonoscopy (N/A, 2021); and  section (23).    Violette has a current medication list which includes the following  prescription(s): multivitamin, omega-3/dha/epa/dpa/fish oil, UNABLE TO FIND, and UNABLE TO FIND.    Review of patient's allergies indicates:   Allergen Reactions    Corticosteroids (glucocorticoids) Palpitations     Oral steroid        Objective   Posture          Bilateral scapulae are: Protracted              Shoulder Range of Motion  Right Shoulder   Active (deg) Passive (deg) Pain   Flexion 160       Extension         Scaption         ABduction 160       ADduction         Horizontal ABduction         Horizontal ADduction         External Rotation (Shoulder ABducted 0 degrees)         External Rotation (Shoulder ABducted 45 degrees)         External Rotation (Shoulder ABducted 90 degrees) 80       Internal Rotation (Shoulder ABducted 0 degrees)         Internal Rotation (Shoulder ABducted 45 degrees)         Internal Rotation (Shoulder ABducted 90 degrees) 70         Left Shoulder   Active (deg) Passive (deg) Pain   Flexion 160       Extension         Scaption         ABduction 160       ADduction         Horizontal ABduction         Horizontal ADduction         External Rotation (Shoulder ABducted 0 degrees)         External Rotation (Shoulder ABducted 45 degrees)         External Rotation (Shoulder ABducted 90 degrees) 80       Internal Rotation (Shoulder ABducted 0 degrees)         Internal Rotation (Shoulder ABducted 45 degrees)         Internal Rotation (Shoulder ABducted 90 degrees) 70           Shoulder, Elbow, or Forearm Range of Motion Details: NITISH R: T4 L: T4  FIR: R: T9 L: T6         Shoulder Strength - Planes of Motion   Right Strength Right Pain Left Strength Left  Pain   Flexion 5   5     Extension           ABduction 5   5     ADduction           Horizontal ABduction           Horizontal ADduction           Internal Rotation 0°           Internal Rotation 90° 5   5     External Rotation 0°           External Rotation 90°               Shoulder Strength - Rotator Cuff Muscles   Right Strength Right  Pain Left Strength Left  Pain   Supraspinatus 5   5 Yes   Infraspinatus 5   5     Teres Minor 5   5     Subscapularis 5   5       Shoulder Strength - Scapular Stabilizing Muscles   Right Strength Right Pain Left Strength Left  Pain   Lower Trapezius 3+   3+ Yes   Middle Trapezius 4   4     Rhomboids                       Shoulder Special Tests  Rotator Cuff Tests  Positive: Left Empty Can  Negative: Left Internal Rotation Resistance Strength  Impingement Tests  Positive: Left Navarro-Andre  Negative: Left Painful Arc  Other Shoulder Tests  Positive: Left Speed's                   Intake Outcome Measure for FOTO Survey    Therapist reviewed FOTO scores for Violette Esposito on 2/5/2025.   FOTO report - see Media section or FOTO account episode details.     Intake Score: 70%    Treatment:  Therapeutic Exercise  Therapeutic Exercise Activity 1: HEP setup and instruction; handout issued  Therapeutic Exercise Activity 3: Sidelying ER 3 x 10 2#    Balance/Neuromuscular Re-Education  Balance/Neuromuscular Re-Education Activity 1: Prone T's 3 x 10 2# on last set  Balance/Neuromuscular Re-Education Activity 2: B ER 3 x 10 red theraband  Balance/Neuromuscular Re-Education Activity 3: Horizontal abduction 3 x 10 green theraband    Patient's spiritual, cultural, and educational needs considered and patient agreeable to plan of care and goals.     Assessment & Plan   Assessment  Violette presents with a condition of Low complexity.   Presentation of Symptoms: Stable  Will Comorbidities Impact Care: No       Functional Limitations: Activity tolerance, Pain when reaching, Participating in leisure activities, Other (Comment), Carrying objects  Other Functional Limitations: Pain with overhead lifting  Impairments: Impaired physical strength, Pain with functional activity    Patient Goal for Therapy (PT): To return to gym based exercise routine without pain  Prognosis: Excellent  Prognosis Details: Physical exam is  consistent with rotator cuff tendinopathy/impingement. Primary impairments include AROM, strength,  and pain which limits functional mobility. This pt is a good candidate for skilled PT tx and stands to benefit from a combination of manual therapy, therapeutic exercise, neuromuscular re-education, therapeutic activities, dry needling and modalities Prn. The pt has been educated on their dx/POC and consents to further PT tx.     Plan  From a physical therapy perspective, the patient would benefit from: Skilled Rehab Services    Planned therapy interventions include: Therapeutic exercise, Therapeutic activities, Neuromuscular re-education, Manual therapy, and Other (Comment). Dry needling   Planned modalities to include: Cryotherapy (cold pack) and Electrical stimulation - passive/unattended.        Visit Frequency: 1 times Per Week for 8 Weeks.       This plan was discussed with Patient.   Discussion participants: Agreed Upon Plan of Care  Plan details: May increase to 2x weekly if indicated           Goals:   Active       Functional outcome       Patient will show a significant change in FOTO patient-reported outcome tool to demonstrate subjective improvement       Start:  02/05/25    Expected End:  04/02/25            Patient stated goal: To return to gym based exercise routine without pain        Start:  02/05/25    Expected End:  04/02/25               Lifting & carrying objects       Patient will perform overhead gym based strengthening with left shoulder pain no worse than 1/10       Start:  02/05/25    Expected End:  03/05/25               Pain       Patient will report pain of 1/10 demonstrating a reduction of overall pain       Start:  02/05/25    Expected End:  03/05/25               Strength       Patient will achieve left shoulder external rotation strength of 5/5 in all positions       Start:  02/05/25    Expected End:  04/02/25            Patient will achieve lower trap strength of 4/5 or greater         Start:  02/05/25    Expected End:  04/02/25                Walker Howe PT, DPT

## 2025-02-19 ENCOUNTER — CLINICAL SUPPORT (OUTPATIENT)
Dept: REHABILITATION | Facility: HOSPITAL | Age: 35
End: 2025-02-19
Payer: COMMERCIAL

## 2025-02-19 DIAGNOSIS — M25.511 ACUTE PAIN OF BOTH SHOULDERS: Primary | ICD-10-CM

## 2025-02-19 DIAGNOSIS — M25.512 ACUTE PAIN OF BOTH SHOULDERS: Primary | ICD-10-CM

## 2025-02-19 PROCEDURE — 97530 THERAPEUTIC ACTIVITIES: CPT | Mod: PN | Performed by: PHYSICAL THERAPIST

## 2025-02-19 PROCEDURE — 97112 NEUROMUSCULAR REEDUCATION: CPT | Mod: PN | Performed by: PHYSICAL THERAPIST

## 2025-02-19 PROCEDURE — 97110 THERAPEUTIC EXERCISES: CPT | Mod: PN | Performed by: PHYSICAL THERAPIST

## 2025-02-19 NOTE — PROGRESS NOTES
Outpatient Rehab    Physical Therapy Visit    Patient Name: Violette Esposito  MRN: [id number]  YOB: 1990  Today's Date: 2/19/2025    Therapy Diagnosis:   Encounter Diagnosis   Name Primary?    Acute pain of both shoulders Yes     Physician: Bruce Rachel MD    Physician Orders: Eval and Treat  Medical Diagnosis:  Chronic left shoulder pain      Visit # / Visits Authorized:  1 / 10  Date of Evaluation:  2/5/2025   Insurance Authorization Period: 02/05/25 to 12/31/25  Plan of Care Certification:  2/5/2025 to 04/02/25                  Time In: 0730   Time Out: 0820  Total Time: 50   Total Billable Time: 50    FOTO:  Intake Score:  %  Survey Score 1:  %  Survey Score 2:  %         Subjective   Pt states that her shoulders are doing well. A little pain with overhead lifts, but better than they were.  Pain reported as 0/10. left shoulder    Objective            Treatment:  Therapeutic Exercise  Therapeutic Exercise Activity 1: UBE 3'/3' L4  Therapeutic Exercise Activity 2: Sidelying ER 3 x 10 B 2#  Therapeutic Exercise Activity 3: Scaption w iso holds 3 x 8 B 2#  Therapeutic Exercise Activity 4: 90/90 ER B 3 x 10 red theratube    Balance/Neuromuscular Re-Education  Balance/Neuromuscular Re-Education Activity 1: Supine upside down KB serratus punches 3 x 8 B 5#  Balance/Neuromuscular Re-Education Activity 2: Prone T's/Y's 3 x 8 B 1#  Balance/Neuromuscular Re-Education Activity 3: Horizontal abduction/diagonal circuit red therband 3 x 10    Therapeutic Activity  Therapeutic Activity 1: upside down overhead KB press B 3 x 10 5#  Therapeutic Activity 2: 90/90 upside down KB carries 3 laps ea    Assessment & Plan   Assessment: Fatigued at end of session. Verbal/tactile cues to maintain proper shoulder position during exercise activities       Patient will continue to benefit from skilled outpatient physical therapy to address the deficits listed in the problem list box on initial evaluation,  provide pt/family education and to maximize pt's level of independence in the home and community environment.     Patient's spiritual, cultural, and educational needs considered and patient agreeable to plan of care and goals.     Education  Education was done with Patient. The patient's learning style includes Demonstration and Listening. The patient Verbalizes understanding.                 Plan: continue RTC and periscapular strengthening    Goals:   Active       Functional outcome       Patient will show a significant change in FOTO patient-reported outcome tool to demonstrate subjective improvement       Start:  02/05/25    Expected End:  04/02/25            Patient stated goal: To return to gym based exercise routine without pain  (Progressing)       Start:  02/05/25    Expected End:  04/02/25               Lifting & carrying objects       Patient will perform overhead gym based strengthening with left shoulder pain no worse than 1/10 (Progressing)       Start:  02/05/25    Expected End:  03/05/25               Pain       Patient will report pain of 1/10 demonstrating a reduction of overall pain (Progressing)       Start:  02/05/25    Expected End:  03/05/25               Strength       Patient will achieve left shoulder external rotation strength of 5/5 in all positions (Progressing)       Start:  02/05/25    Expected End:  04/02/25            Patient will achieve lower trap strength of 4/5 or greater  (Progressing)       Start:  02/05/25    Expected End:  04/02/25                Walker Howe, PT, DPT

## 2025-02-26 ENCOUNTER — CLINICAL SUPPORT (OUTPATIENT)
Dept: REHABILITATION | Facility: HOSPITAL | Age: 35
End: 2025-02-26
Payer: COMMERCIAL

## 2025-02-26 DIAGNOSIS — M25.512 ACUTE PAIN OF BOTH SHOULDERS: Primary | ICD-10-CM

## 2025-02-26 DIAGNOSIS — M25.511 ACUTE PAIN OF BOTH SHOULDERS: Primary | ICD-10-CM

## 2025-02-26 PROCEDURE — 97530 THERAPEUTIC ACTIVITIES: CPT | Mod: PN | Performed by: PHYSICAL THERAPIST

## 2025-02-26 PROCEDURE — 97110 THERAPEUTIC EXERCISES: CPT | Mod: PN | Performed by: PHYSICAL THERAPIST

## 2025-02-26 PROCEDURE — 97112 NEUROMUSCULAR REEDUCATION: CPT | Mod: PN | Performed by: PHYSICAL THERAPIST

## 2025-02-26 NOTE — PROGRESS NOTES
Outpatient Rehab    Physical Therapy Visit    Patient Name: Violette Esposito  MRN: [id number]  YOB: 1990  Encounter Date: 2/26/2025    Therapy Diagnosis:   Encounter Diagnosis   Name Primary?    Acute pain of both shoulders Yes     Physician: Bruce Rachel MD    Physician Orders: Eval and Treat  Medical Diagnosis:  Chronic left shoulder pain      Visit # / Visits Authorized:  1 / 10  Date of Evaluation:  2/5/2025   Insurance Authorization Period: 02/05/25 to 12/31/25  Plan of Care Certification:  2/5/2025 to     Time In: 0730   Time Out: 0825  Total Time: 55   Total Billable Time: 55    FOTO:  Intake Score:  %  Survey Score 1:  %  Survey Score 2:  %         Subjective   Pt states that her shoulders are doing well. A little pain with overhead lifts, but better than they were.  Pain reported as 0/10.      Objective            Treatment:  Therapeutic Exercise  Therapeutic Exercise Activity 1: UBE 3'/3' L4  Therapeutic Exercise Activity 2: Sidelying ER 3 x 10 B 2#  Therapeutic Exercise Activity 3: Scaption w iso holds 3 x 8 B 2#  Therapeutic Exercise Activity 4: 90/90 ER B 3 x 10 red theratube    Balance/Neuromuscular Re-Education  Balance/Neuromuscular Re-Education Activity 1: Supine upside down KB serratus punches 3 x 8 B 5#  Balance/Neuromuscular Re-Education Activity 2: Prone T's/Y's 3 x 8 B 1#  Balance/Neuromuscular Re-Education Activity 3: Horizontal abduction/diagonal circuit red therband 3 x 10  Balance/Neuromuscular Re-Education Activity 4: Serratus clocks 3 x 8 B yellow theraband    Therapeutic Activity  Therapeutic Activity 1: upside down overhead KB press B 3 x 10 5#  Therapeutic Activity 2: 90/90 upside down KB carries 3 laps ea    Assessment & Plan   Assessment: Shoulder pain remains reduced. Fatigued at end of session.       Patient will continue to benefit from skilled outpatient physical therapy to address the deficits listed in the problem list box on initial  evaluation, provide pt/family education and to maximize pt's level of independence in the home and community environment.     Patient's spiritual, cultural, and educational needs considered and patient agreeable to plan of care and goals.           Plan: continue RTC and periscapular strengthening    Goals:   Active       Functional outcome       Patient will show a significant change in FOTO patient-reported outcome tool to demonstrate subjective improvement       Start:  02/05/25    Expected End:  04/02/25            Patient stated goal: To return to gym based exercise routine without pain  (Progressing)       Start:  02/05/25    Expected End:  04/02/25               Lifting & carrying objects       Patient will perform overhead gym based strengthening with left shoulder pain no worse than 1/10 (Progressing)       Start:  02/05/25    Expected End:  03/05/25               Pain       Patient will report pain of 1/10 demonstrating a reduction of overall pain (Progressing)       Start:  02/05/25    Expected End:  03/05/25               Strength       Patient will achieve left shoulder external rotation strength of 5/5 in all positions (Progressing)       Start:  02/05/25    Expected End:  04/02/25            Patient will achieve lower trap strength of 4/5 or greater  (Progressing)       Start:  02/05/25    Expected End:  04/02/25                Walker Howe, PT, DPT

## 2025-05-29 PROBLEM — E78.49 OTHER HYPERLIPIDEMIA: Status: ACTIVE | Noted: 2023-03-30

## 2025-07-23 ENCOUNTER — LAB VISIT (OUTPATIENT)
Dept: LAB | Facility: HOSPITAL | Age: 35
End: 2025-07-23
Attending: PHYSICIAN ASSISTANT
Payer: COMMERCIAL

## 2025-07-23 ENCOUNTER — OFFICE VISIT (OUTPATIENT)
Dept: FAMILY MEDICINE | Facility: CLINIC | Age: 35
End: 2025-07-23
Payer: COMMERCIAL

## 2025-07-23 VITALS
HEIGHT: 62 IN | HEART RATE: 87 BPM | DIASTOLIC BLOOD PRESSURE: 70 MMHG | WEIGHT: 117.88 LBS | RESPIRATION RATE: 18 BRPM | BODY MASS INDEX: 21.69 KG/M2 | SYSTOLIC BLOOD PRESSURE: 108 MMHG

## 2025-07-23 DIAGNOSIS — Z00.00 ENCOUNTER FOR ANNUAL HEALTH EXAMINATION: ICD-10-CM

## 2025-07-23 DIAGNOSIS — E78.49 OTHER HYPERLIPIDEMIA: ICD-10-CM

## 2025-07-23 DIAGNOSIS — Z00.00 ENCOUNTER FOR ANNUAL HEALTH EXAMINATION: Primary | ICD-10-CM

## 2025-07-23 LAB
ABSOLUTE EOSINOPHIL (OHS): 0.1 K/UL
ABSOLUTE MONOCYTE (OHS): 0.83 K/UL (ref 0.3–1)
ABSOLUTE NEUTROPHIL COUNT (OHS): 2.13 K/UL (ref 1.8–7.7)
ALBUMIN SERPL BCP-MCNC: 4.5 G/DL (ref 3.5–5.2)
ALP SERPL-CCNC: 48 UNIT/L (ref 40–150)
ALT SERPL W/O P-5'-P-CCNC: 14 UNIT/L (ref 10–44)
ANION GAP (OHS): 8 MMOL/L (ref 8–16)
AST SERPL-CCNC: 17 UNIT/L (ref 11–45)
BASOPHILS # BLD AUTO: 0.04 K/UL
BASOPHILS NFR BLD AUTO: 0.7 %
BILIRUB SERPL-MCNC: 0.5 MG/DL (ref 0.1–1)
BUN SERPL-MCNC: 13 MG/DL (ref 6–20)
CALCIUM SERPL-MCNC: 9.2 MG/DL (ref 8.7–10.5)
CHLORIDE SERPL-SCNC: 107 MMOL/L (ref 95–110)
CHOLEST SERPL-MCNC: 210 MG/DL (ref 120–199)
CHOLEST/HDLC SERPL: 3.3 {RATIO} (ref 2–5)
CO2 SERPL-SCNC: 22 MMOL/L (ref 23–29)
CREAT SERPL-MCNC: 0.9 MG/DL (ref 0.5–1.4)
ERYTHROCYTE [DISTWIDTH] IN BLOOD BY AUTOMATED COUNT: 12.6 % (ref 11.5–14.5)
GFR SERPLBLD CREATININE-BSD FMLA CKD-EPI: >60 ML/MIN/1.73/M2
GLUCOSE SERPL-MCNC: 83 MG/DL (ref 70–110)
HCT VFR BLD AUTO: 41.8 % (ref 37–48.5)
HDLC SERPL-MCNC: 64 MG/DL (ref 40–75)
HDLC SERPL: 30.5 % (ref 20–50)
HGB BLD-MCNC: 13.7 GM/DL (ref 12–16)
IMM GRANULOCYTES # BLD AUTO: 0.01 K/UL (ref 0–0.04)
IMM GRANULOCYTES NFR BLD AUTO: 0.2 % (ref 0–0.5)
LDLC SERPL CALC-MCNC: 131.4 MG/DL (ref 63–159)
LYMPHOCYTES # BLD AUTO: 2.54 K/UL (ref 1–4.8)
MCH RBC QN AUTO: 29 PG (ref 27–31)
MCHC RBC AUTO-ENTMCNC: 32.8 G/DL (ref 32–36)
MCV RBC AUTO: 89 FL (ref 82–98)
NONHDLC SERPL-MCNC: 146 MG/DL
NUCLEATED RBC (/100WBC) (OHS): 0 /100 WBC
PLATELET # BLD AUTO: 287 K/UL (ref 150–450)
PMV BLD AUTO: 10.6 FL (ref 9.2–12.9)
POTASSIUM SERPL-SCNC: 4.7 MMOL/L (ref 3.5–5.1)
PROT SERPL-MCNC: 7.3 GM/DL (ref 6–8.4)
RBC # BLD AUTO: 4.72 M/UL (ref 4–5.4)
RELATIVE EOSINOPHIL (OHS): 1.8 %
RELATIVE LYMPHOCYTE (OHS): 45 % (ref 18–48)
RELATIVE MONOCYTE (OHS): 14.7 % (ref 4–15)
RELATIVE NEUTROPHIL (OHS): 37.6 % (ref 38–73)
SODIUM SERPL-SCNC: 137 MMOL/L (ref 136–145)
TRIGL SERPL-MCNC: 73 MG/DL (ref 30–150)
TSH SERPL-ACNC: 2.63 UIU/ML (ref 0.4–4)
WBC # BLD AUTO: 5.65 K/UL (ref 3.9–12.7)

## 2025-07-23 PROCEDURE — 1160F RVW MEDS BY RX/DR IN RCRD: CPT | Mod: CPTII,S$GLB,, | Performed by: PHYSICIAN ASSISTANT

## 2025-07-23 PROCEDURE — 84443 ASSAY THYROID STIM HORMONE: CPT

## 2025-07-23 PROCEDURE — 3078F DIAST BP <80 MM HG: CPT | Mod: CPTII,S$GLB,, | Performed by: PHYSICIAN ASSISTANT

## 2025-07-23 PROCEDURE — 80061 LIPID PANEL: CPT

## 2025-07-23 PROCEDURE — 36415 COLL VENOUS BLD VENIPUNCTURE: CPT | Mod: PO

## 2025-07-23 PROCEDURE — 3074F SYST BP LT 130 MM HG: CPT | Mod: CPTII,S$GLB,, | Performed by: PHYSICIAN ASSISTANT

## 2025-07-23 PROCEDURE — 1159F MED LIST DOCD IN RCRD: CPT | Mod: CPTII,S$GLB,, | Performed by: PHYSICIAN ASSISTANT

## 2025-07-23 PROCEDURE — 85025 COMPLETE CBC W/AUTO DIFF WBC: CPT

## 2025-07-23 PROCEDURE — 99999 PR PBB SHADOW E&M-EST. PATIENT-LVL III: CPT | Mod: PBBFAC,,, | Performed by: PHYSICIAN ASSISTANT

## 2025-07-23 PROCEDURE — 3008F BODY MASS INDEX DOCD: CPT | Mod: CPTII,S$GLB,, | Performed by: PHYSICIAN ASSISTANT

## 2025-07-23 PROCEDURE — 99395 PREV VISIT EST AGE 18-39: CPT | Mod: S$GLB,,, | Performed by: PHYSICIAN ASSISTANT

## 2025-07-23 PROCEDURE — 80053 COMPREHEN METABOLIC PANEL: CPT

## 2025-07-23 NOTE — PROGRESS NOTES
This note is specifically for wellness visit performed today.         Assessment / Plan:       Patient here for annual wellness exam. Health maintenance was reviewed and ordered.     Complete history and physical was completed today. Complete and thorough medication reconciliation was performed. Discussed risks and benefits of medications. Advised patient on orders and health maintenance. Continue current medications listed on your summary sheet.     All questions were answered. Patient had no further concerns. Advised of diagnoses and plan.     1. Encounter for annual health examination  -     CBC Auto Differential; Future; Expected date: 2025  -     Comprehensive Metabolic Panel; Future; Expected date: 2025  -     TSH; Future; Expected date: 2025    2. Other hyperlipidemia  Overview:  -chronic condition. Currently stable.    -managed with diet and exercise  -most recent labs listed below:  Lab Results   Component Value Date    CHOL 191 2025     Lab Results   Component Value Date    HDL 54 2025     Lab Results   Component Value Date    LDLCALC 124.0 2025     Lab Results   Component Value Date    TRIG 65 2025     Lab Results   Component Value Date    ALT 20 2024    AST 24 2024    ALKPHOS 46 2024    BILITOT 0.4 2024         Orders:  -     Lipid Panel; Future; Expected date: 2025        Follow up in about 1 year (around 2026), or if symptoms worsen or fail to improve.    Delores Porter PA-C      WELLNESS EXAM      Patient ID: Violette Esposito is a 35 y.o. female.  has a past medical history of Anxiety, Delivery of pregnancy by  section, Depression, Digestive disorder, Gestational diabetes, Pre-eclampsia in third trimester (2023), and Unspecified pre-eclampsia, unspecified trimester.     Chief Complaint:  Encounter for wellness exam    Well Adult Physical: Patient here for a comprehensive physical exam.      MEDICAL HISTORY:  She reports previously enlarged lymph nodes that resolved by . ENT evaluation indicated lymph node inflammation was likely due to sinusitis and was not concerning. She denies current lymph node swelling or associated symptoms. She also reports previous left shoulder pain that resolved with physical therapy and modification of exercise routine from body pump to heavy lifting with low repetitions. She currently reports no shoulder pain.    HYPERLIPIDEMIA:  She has history of elevated cholesterol that has now normalized on recent labs. She discontinued all cholesterol-related supplements approximately one month ago to observe her body's natural response.    IMMUNIZATIONS:  She is current on recommended vaccinations, typically receiving COVID and flu vaccines at [facility] pharmacy.     She is otherwise well and denies any new complaints today.     Health Maintenance Topics with due status: Not Due       Topic Last Completion Date    TETANUS VACCINE 2023    Cervical Cancer Screening 2024    Influenza Vaccine 2024    RSV Vaccine (Age 60+ and Pregnant patients) Not Due      ==============================================  There are no preventive care reminders to display for this patient.    Past Medical History:   Diagnosis Date    Anxiety     Delivery of pregnancy by  section     Depression     Digestive disorder     diarrhea    Gestational diabetes     Pre-eclampsia in third trimester 2023    Unspecified pre-eclampsia, unspecified trimester      Past Surgical History:   Procedure Laterality Date     SECTION  23    COLONOSCOPY N/A 2021    Procedure: COLONOSCOPY;  Surgeon: Gayle Mcneill MD;  Location: [facility];  Service: Endoscopy;  Laterality: N/A;    ESOPHAGOGASTRODUODENOSCOPY N/A 2021    Procedure: EGD (ESOPHAGOGASTRODUODENOSCOPY);  Surgeon: Gayle Mcneill MD;  Location: [facility];  Service: Endoscopy;  Laterality: N/A;     WISDOM TOOTH EXTRACTION       Review of patient's allergies indicates:   Allergen Reactions    Corticosteroids (glucocorticoids) Palpitations     Oral steroid     Medications Ordered Prior to Encounter[1]  Social History[2]  Family History   Problem Relation Name Age of Onset    Alcohol abuse Mother Maeve Pierre     Depression Mother Maeve Pierre     Drug abuse Mother Maeve Pierre     Diabetes Father Derrick Adams         Prediabetic    Hearing loss Father Derrick Adams     Hyperlipidemia Father Derrick Adams     Miscarriages / Stillbirths Sister Arnaldo Quiroz     Cancer Paternal Grandfather FARHAD         Colorectal cancer    Hearing loss Paternal Grandfather FARHAD     Stroke Paternal Grandfather JM     Diabetes Maternal Aunt Great Aunt Marcie     Miscarriages / Stillbirths Maternal Aunt Great Aunt Marcie         Cannot have children       Review of Systems   Constitutional:  Negative for chills, fever and malaise/fatigue.   Respiratory:  Negative for cough and shortness of breath.    Cardiovascular:  Negative for chest pain, palpitations and leg swelling.   Gastrointestinal:  Negative for abdominal pain, constipation and diarrhea.   Genitourinary:  Negative for dysuria and frequency.   Musculoskeletal:  Negative for back pain and joint pain.   Neurological:  Negative for headaches.   Psychiatric/Behavioral:  Negative for depression. The patient is not nervous/anxious.            Objective:      Vitals:    07/23/25 0732   BP: 108/70   Pulse: 87   Resp: 18     Body mass index is 21.56 kg/m².     Physical Exam  Constitutional:       General: She is not in acute distress.     Appearance: Normal appearance. She is well-developed.   HENT:      Head: Normocephalic and atraumatic.   Eyes:      Conjunctiva/sclera: Conjunctivae normal.   Cardiovascular:      Rate and Rhythm: Normal rate and regular rhythm.      Pulses: Normal pulses.      Heart sounds: Normal heart sounds. No murmur heard.  Pulmonary:      Effort: Pulmonary effort  is normal. No respiratory distress.      Breath sounds: Normal breath sounds.   Abdominal:      General: Bowel sounds are normal. There is no distension.      Palpations: Abdomen is soft.      Tenderness: There is no abdominal tenderness.   Musculoskeletal:         General: Normal range of motion.      Cervical back: Normal range of motion and neck supple.   Skin:     General: Skin is warm and dry.      Findings: No rash.   Neurological:      General: No focal deficit present.      Mental Status: She is alert and oriented to person, place, and time.   Psychiatric:         Mood and Affect: Mood normal.         Behavior: Behavior normal.             All labs, imaging and procedures performed since last visit have been personally reviewed.    This note was generated with the assistance of ambient listening technology. Verbal consent was obtained by the patient and accompanying visitor(s) for the recording of patient appointment to facilitate this note. I attest to having reviewed and edited the generated note for accuracy, though some syntax or spelling errors may persist. Please contact the author of this note for any clarification.           [1]   Current Outpatient Medications on File Prior to Visit   Medication Sig Dispense Refill    MULTIVITAMIN ORAL Take by mouth.      omega-3/dha/epa/dpa/fish oil (OMEGA-3 2100 ORAL)       UNABLE TO FIND MAGNESIUM AND ASHWAGHANDA       No current facility-administered medications on file prior to visit.   [2]   Social History  Socioeconomic History    Marital status:    Tobacco Use    Smoking status: Never    Smokeless tobacco: Never   Substance and Sexual Activity    Alcohol use: Not Currently    Drug use: Never    Sexual activity: Yes     Partners: Female     Birth control/protection: Condom, Other-see comments     Comment: Breastfeeding, cycle tracking     Social Drivers of Health     Financial Resource Strain: Low Risk  (1/13/2025)    Overall Financial Resource Strain  (CARDIA)     Difficulty of Paying Living Expenses: Not hard at all   Food Insecurity: No Food Insecurity (1/13/2025)    Hunger Vital Sign     Worried About Running Out of Food in the Last Year: Never true     Ran Out of Food in the Last Year: Never true   Transportation Needs: No Transportation Needs (12/20/2023)    PRAPARE - Transportation     Lack of Transportation (Medical): No     Lack of Transportation (Non-Medical): No   Physical Activity: Sufficiently Active (1/13/2025)    Exercise Vital Sign     Days of Exercise per Week: 4 days     Minutes of Exercise per Session: 50 min   Stress: No Stress Concern Present (1/13/2025)    [employer] [place] [employer] - Occupational Stress Questionnaire     Feeling of Stress : Not at all   Housing Stability: Low Risk  (12/20/2023)    Housing Stability Vital Sign     Unable to Pay for Housing in the Last Year: No     Number of Places Lived in the Last Year: 1     Unstable Housing in the Last Year: No

## 2025-08-29 ENCOUNTER — OFFICE VISIT (OUTPATIENT)
Dept: FAMILY MEDICINE | Facility: CLINIC | Age: 35
End: 2025-08-29
Payer: COMMERCIAL

## 2025-08-29 VITALS
HEART RATE: 104 BPM | BODY MASS INDEX: 22.03 KG/M2 | WEIGHT: 119.69 LBS | DIASTOLIC BLOOD PRESSURE: 80 MMHG | HEIGHT: 62 IN | SYSTOLIC BLOOD PRESSURE: 118 MMHG | RESPIRATION RATE: 16 BRPM

## 2025-08-29 DIAGNOSIS — J01.90 ACUTE BACTERIAL SINUSITIS: Primary | ICD-10-CM

## 2025-08-29 DIAGNOSIS — B96.89 ACUTE BACTERIAL SINUSITIS: Primary | ICD-10-CM

## 2025-08-29 PROCEDURE — 99999 PR PBB SHADOW E&M-EST. PATIENT-LVL III: CPT | Mod: PBBFAC,,, | Performed by: PHYSICIAN ASSISTANT

## 2025-08-29 RX ORDER — AZITHROMYCIN 250 MG/1
TABLET, FILM COATED ORAL
Qty: 6 TABLET | Refills: 0 | Status: SHIPPED | OUTPATIENT
Start: 2025-08-29 | End: 2025-09-03